# Patient Record
Sex: MALE | Race: WHITE | URBAN - METROPOLITAN AREA
[De-identification: names, ages, dates, MRNs, and addresses within clinical notes are randomized per-mention and may not be internally consistent; named-entity substitution may affect disease eponyms.]

---

## 2023-04-20 ENCOUNTER — OFFICE VISIT (OUTPATIENT)
Dept: CARDIOLOGY CLINIC | Age: 78
End: 2023-04-20

## 2023-04-20 ENCOUNTER — TELEPHONE (OUTPATIENT)
Dept: CARDIOLOGY CLINIC | Age: 78
End: 2023-04-20

## 2023-04-20 VITALS
WEIGHT: 127.2 LBS | HEART RATE: 90 BPM | OXYGEN SATURATION: 92 % | SYSTOLIC BLOOD PRESSURE: 130 MMHG | DIASTOLIC BLOOD PRESSURE: 70 MMHG

## 2023-04-20 DIAGNOSIS — Z76.89 ENCOUNTER TO ESTABLISH CARE: ICD-10-CM

## 2023-04-20 DIAGNOSIS — Z95.828 HISTORY OF ENDOVASCULAR STENT GRAFT FOR ABDOMINAL AORTIC ANEURYSM (AAA): ICD-10-CM

## 2023-04-20 DIAGNOSIS — J44.9 CHRONIC OBSTRUCTIVE PULMONARY DISEASE, UNSPECIFIED COPD TYPE (HCC): ICD-10-CM

## 2023-04-20 DIAGNOSIS — G45.9 TIA (TRANSIENT ISCHEMIC ATTACK): Primary | ICD-10-CM

## 2023-04-20 RX ORDER — CALCIUM CARBONATE 300MG(750)
TABLET,CHEWABLE ORAL
COMMUNITY

## 2023-04-20 RX ORDER — ASPIRIN 81 MG/1
TABLET ORAL DAILY
COMMUNITY

## 2023-04-20 RX ORDER — VITAMIN A 2400 MCG
CAPSULE ORAL
COMMUNITY

## 2023-04-20 RX ORDER — MULTIVITAMIN
1 TABLET ORAL DAILY
COMMUNITY

## 2023-04-20 NOTE — PROGRESS NOTES
Efrain Staples is a 68 y.o. male    Vitals:    04/20/23 1055   BP: 130/70   BP 1 Location: Left upper arm   BP Patient Position: Sitting   BP Cuff Size: Adult   Pulse: 90   Weight: 127 lb 3.2 oz (57.7 kg)   SpO2: 92%       Chief Complaint   Patient presents with    New Patient     Ref Dr. Rula Mistry       Chest pain no  SOB YES  Dizziness YES  Swelling no  Recent hospital visit no  Refills no  COVID VACCINE no  HAD COVID? Yes    Pt takes a cholesterol pill, does not know what it is.      CVA \"MINI STROKE\"

## 2023-04-20 NOTE — TELEPHONE ENCOUNTER
Enrolled with [employer] - Ordered and being shipped to patient's home address on file. ETA within 5-7 business days. 30 day event monitor  Received:  Today  MD Santino Flores  Can you please mail him a 30 day monitor for a TIA     Thanks   SK

## 2023-04-20 NOTE — PROGRESS NOTES
Orders for Check an echo (TIA) and abd aorta doppler (history AAA stenting)     See NP in 8 weeks to review results  per Dr. Vivek Robledo.

## 2023-04-20 NOTE — PROGRESS NOTES
Molly Matthew MD. [facility]          Patient: Nayeli Bautista  : 1945      Today's Date: 2023        HISTORY OF PRESENT ILLNESS:     History of Present Illness:    Referred for TIA's    He says he had one many years ago and then he thinks he had one a few months ago. He says he had dizziness - hard staying on his feet - had tunnel vision -- he did not go to the hospital.  No speech changes or focal weaknesses. He did not go to the hospital.  He did have a head CT scan and other tests with Dr. Francisco Paige. Heart can race sometimes. Has chronic ROSENTHAL - class 3 - chronic. No CP. Still smokes. PAST MEDICAL HISTORY:     Past Medical History:   Diagnosis Date    AAA (abdominal aortic aneurysm) ([facility]     stenting 2019 by Dr. Jazz Oglesby    COPD (chronic obstructive pulmonary disease) ([facility]     History of lung surgery     Lung mass     TIA (transient ischemic attack)            CURRENT MEDICATIONS:    .  Current Outpatient Medications   Medication Sig Dispense Refill    melatonin 10 mg TbDi Take  by mouth. aspirin delayed-release 81 mg tablet Take  by mouth daily. vitamin B complex (B COMPLEX-VITAMIN B12 PO) Take  by mouth.      multivitamin (ONE A DAY) tablet Take 1 Tablet by mouth daily. michelle, Zingiber officinalis, (michelle extract) 250 mg cap Take  by mouth. OTHER PT TAKES A CHOLESTEROL MED, DOES NOT KNOW WHAT THE NAME IS. Allergies   Allergen Reactions    Wool Itching         SOCIAL HISTORY:     Social History     Tobacco Use    Smoking status: Every Day     Types: Cigarettes   Substance Use Topics    Alcohol use: Not Currently         FAMILY HISTORY:     Family History   Problem Relation Age of Onset    Heart Attack Mother     Cancer Father          REVIEW OF SYMPTOMS:     Review of Symptoms:  Constitutional: Negative for fever, chills  HEENT: Negative for nosebleeds  Respiratory: + cough, wheezing  Cardiovascular: Negative for syncope, and PND.    Gastrointestinal: Negative for abdominal pain, diarrhea, melena. Genitourinary: Negative for dysuria  Musculoskeletal: + joint pain. Skin: Negative for rash  Heme: No problems bleeding. Neurological: Negative for speech change and focal weakness. PHYSICAL EXAM:     Physical Exam:  Visit Vitals  /70 (BP 1 Location: Left upper arm, BP Patient Position: Sitting, BP Cuff Size: Adult)   Pulse 90   Wt 127 lb 3.2 oz (57.7 kg)   SpO2 92%       Patient appears generally well, mood and affect are appropriate and pleasant  + Frail   HEENT:  Hearing intact, non-icteric, normocephalic, atraumatic. Neck Exam: Supple, no bruits or JVD  Lung Exam: Clear to auscultation, even breath sounds. Cardiac Exam: Regular rate and rhythm with no murmur or rub  Abdomen: Soft, non-tender  Extremities: Moves all ext well. No lower extremity edema. MSKTL: Overall good ROM ext  Skin: No significant rashes  Psych: Appropriate affect  Neuro - Grossly intact        LABS / OTHER STUDIES:     Will get labs from Dr. Keyla Mcdonough:     Cardiac Evaluation Includes:  I reviewed the test results below. EKG 4/20/23 - NSR, normal       ASSESSMENT AND PLAN:     Assessment and Plan:    TIA   - says he had one years ago and then in 2023 - had dizziness and vision changes with 2023 event --- he did not go to the hospital   - will get records from Dr. Maureen Golden   - check an echo and event monitor (has heart racing at times)   - cont ASA and statin ? (Says he takes a cholesterol med)     Dyslipidemia   - says he takes a cholesterol pill     AAA   - had stenting for AAA in 2019  - says he has not followed up with vascular surgery for years   - check an aorta doppler     COPD     Smoking cessation discussed     See NP in 8 weeks to review results. Wife passed in 2015. 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        Shelli Bui MD, [address] [facility] [address], [address]  [place] [address]  [address]  [place], [address] [place] [address]  Ph: [phone]    [phone]

## 2023-05-31 ENCOUNTER — TELEPHONE (OUTPATIENT)
Age: 78
End: 2023-05-31

## 2023-05-31 NOTE — TELEPHONE ENCOUNTER
Can you please let her know monitor showed normal findings     Thanks! SK     Spoke with patient, name and  verified. Pt verbalized understanding above message. No other question were asked.    Future Appointments   Date Time Provider Adama Yessi   2023 12:30 PM [facility] ECHO 2 [facility] BS AMB   2023  1:30 PM [facility] VASCULAR [facility] BS AMB   2023 11:20 AM [facility] - [facility] CAV[facility] BS AMB

## 2023-07-11 ENCOUNTER — ANCILLARY PROCEDURE (OUTPATIENT)
Age: 78
End: 2023-07-11
Payer: MEDICARE

## 2023-07-11 ENCOUNTER — ANCILLARY PROCEDURE (OUTPATIENT)
Age: 78
End: 2023-07-11

## 2023-07-11 VITALS
SYSTOLIC BLOOD PRESSURE: 146 MMHG | HEART RATE: 65 BPM | BODY MASS INDEX: 19.93 KG/M2 | HEIGHT: 67 IN | DIASTOLIC BLOOD PRESSURE: 62 MMHG | WEIGHT: 127 LBS

## 2023-07-11 DIAGNOSIS — G45.9 TRANSIENT CEREBRAL ISCHEMIC ATTACK, UNSPECIFIED: ICD-10-CM

## 2023-07-11 DIAGNOSIS — Z95.828 PRESENCE OF OTHER VASCULAR IMPLANTS AND GRAFTS: ICD-10-CM

## 2023-07-11 LAB
ECHO AO ASC DIAM: 3.3 CM
ECHO AO ASCENDING AORTA INDEX: 1.98 CM/M2
ECHO AO ROOT DIAM: 3.6 CM
ECHO AO ROOT INDEX: 2.16 CM/M2
ECHO AV AREA PEAK VELOCITY: 3.4 CM2
ECHO AV AREA VTI: 3.5 CM2
ECHO AV AREA/BSA PEAK VELOCITY: 2 CM2/M2
ECHO AV AREA/BSA VTI: 2.1 CM2/M2
ECHO AV MEAN GRADIENT: 3 MMHG
ECHO AV MEAN VELOCITY: 0.8 M/S
ECHO AV PEAK GRADIENT: 5 MMHG
ECHO AV PEAK VELOCITY: 1.2 M/S
ECHO AV VELOCITY RATIO: 0.83
ECHO AV VTI: 22.9 CM
ECHO BSA: 1.65 M2
ECHO BSA: 1.65 M2
ECHO EST RA PRESSURE: 3 MMHG
ECHO LA DIAMETER INDEX: 2.16 CM/M2
ECHO LA DIAMETER: 3.6 CM
ECHO LA TO AORTIC ROOT RATIO: 1
ECHO LA VOL 2C: 53 ML (ref 18–58)
ECHO LA VOL 4C: 51 ML (ref 18–58)
ECHO LA VOLUME AREA LENGTH: 62 ML
ECHO LA VOLUME INDEX A2C: 32 ML/M2 (ref 16–34)
ECHO LA VOLUME INDEX A4C: 31 ML/M2 (ref 16–34)
ECHO LA VOLUME INDEX AREA LENGTH: 37 ML/M2 (ref 16–34)
ECHO LV E' LATERAL VELOCITY: 10 CM/S
ECHO LV E' SEPTAL VELOCITY: 8 CM/S
ECHO LV EDV A2C: 102 ML
ECHO LV EDV A4C: 104 ML
ECHO LV EDV BP: 105 ML (ref 67–155)
ECHO LV EDV INDEX A4C: 62 ML/M2
ECHO LV EDV INDEX BP: 63 ML/M2
ECHO LV EDV NDEX A2C: 61 ML/M2
ECHO LV EJECTION FRACTION A2C: 60 %
ECHO LV EJECTION FRACTION A4C: 63 %
ECHO LV EJECTION FRACTION BIPLANE: 61 % (ref 55–100)
ECHO LV ESV A2C: 41 ML
ECHO LV ESV A4C: 38 ML
ECHO LV ESV BP: 41 ML (ref 22–58)
ECHO LV ESV INDEX A2C: 25 ML/M2
ECHO LV ESV INDEX A4C: 23 ML/M2
ECHO LV ESV INDEX BP: 25 ML/M2
ECHO LV FRACTIONAL SHORTENING: 28 % (ref 28–44)
ECHO LV INTERNAL DIMENSION DIASTOLE INDEX: 2.57 CM/M2
ECHO LV INTERNAL DIMENSION DIASTOLIC: 4.3 CM (ref 4.2–5.9)
ECHO LV INTERNAL DIMENSION SYSTOLIC INDEX: 1.86 CM/M2
ECHO LV INTERNAL DIMENSION SYSTOLIC: 3.1 CM
ECHO LV IVSD: 0.9 CM (ref 0.6–1)
ECHO LV MASS 2D: 114.2 G (ref 88–224)
ECHO LV MASS INDEX 2D: 68.4 G/M2 (ref 49–115)
ECHO LV POSTERIOR WALL DIASTOLIC: 0.8 CM (ref 0.6–1)
ECHO LV RELATIVE WALL THICKNESS RATIO: 0.37
ECHO LVOT AREA: 3.8 CM2
ECHO LVOT AV VTI INDEX: 0.92
ECHO LVOT DIAM: 2.2 CM
ECHO LVOT MEAN GRADIENT: 2 MMHG
ECHO LVOT PEAK GRADIENT: 4 MMHG
ECHO LVOT PEAK VELOCITY: 1 M/S
ECHO LVOT STROKE VOLUME INDEX: 47.8 ML/M2
ECHO LVOT SV: 79.8 ML
ECHO LVOT VTI: 21 CM
ECHO MV A VELOCITY: 0.7 M/S
ECHO MV AREA PHT: 3.7 CM2
ECHO MV AREA VTI: 4 CM2
ECHO MV E DECELERATION TIME (DT): 207.1 MS
ECHO MV E VELOCITY: 0.53 M/S
ECHO MV E/A RATIO: 0.76
ECHO MV E/E' LATERAL: 5.3
ECHO MV E/E' RATIO (AVERAGED): 5.96
ECHO MV E/E' SEPTAL: 6.63
ECHO MV LVOT VTI INDEX: 0.96
ECHO MV MAX VELOCITY: 0.9 M/S
ECHO MV MEAN GRADIENT: 1 MMHG
ECHO MV MEAN VELOCITY: 0.5 M/S
ECHO MV PEAK GRADIENT: 3 MMHG
ECHO MV PRESSURE HALF TIME (PHT): 60.1 MS
ECHO MV VTI: 20.1 CM
ECHO RA AREA 4C: 16 CM2
ECHO RA END SYSTOLIC VOLUME APICAL 4 CHAMBER INDEX BSA: 27 ML/M2
ECHO RA VOLUME: 45 ML
ECHO RIGHT VENTRICULAR SYSTOLIC PRESSURE (RVSP): 64 MMHG
ECHO RV INTERNAL DIMENSION: 4.6 CM
ECHO RV TAPSE: 2.1 CM (ref 1.7–?)
ECHO TV REGURGITANT MAX VELOCITY: 3.89 M/S
ECHO TV REGURGITANT PEAK GRADIENT: 61 MMHG
VAS AORTA MID AP: 2.04 CM
VAS AORTA MID TRANS: 2.15 CM
VAS AORTA PROX AP: 1.72 CM
VAS AORTA PROX TR: 1.72 CM
VAS EVAR LEFT LIMB PROX PSV: 63 CM/S
VAS EVAR RIGHT LIMB PROX PSV: 60 CM/S

## 2023-07-11 PROCEDURE — 93306 TTE W/DOPPLER COMPLETE: CPT

## 2023-07-11 PROCEDURE — 93978 VASCULAR STUDY: CPT | Performed by: SPECIALIST

## 2023-07-11 PROCEDURE — 93978 VASCULAR STUDY: CPT

## 2023-07-17 ENCOUNTER — OFFICE VISIT (OUTPATIENT)
Age: 78
End: 2023-07-17
Payer: MEDICARE

## 2023-07-17 VITALS
HEART RATE: 73 BPM | OXYGEN SATURATION: 96 % | BODY MASS INDEX: 19.24 KG/M2 | HEIGHT: 67 IN | WEIGHT: 122.6 LBS | DIASTOLIC BLOOD PRESSURE: 70 MMHG | SYSTOLIC BLOOD PRESSURE: 140 MMHG

## 2023-07-17 DIAGNOSIS — Z86.73 HISTORY OF TIA (TRANSIENT ISCHEMIC ATTACK): ICD-10-CM

## 2023-07-17 DIAGNOSIS — I10 HYPERTENSION, UNSPECIFIED TYPE: Primary | ICD-10-CM

## 2023-07-17 DIAGNOSIS — J44.9 CHRONIC OBSTRUCTIVE PULMONARY DISEASE, UNSPECIFIED COPD TYPE (HCC): ICD-10-CM

## 2023-07-17 DIAGNOSIS — Z95.828 PRESENCE OF OTHER VASCULAR IMPLANTS AND GRAFTS: ICD-10-CM

## 2023-07-17 PROCEDURE — 99214 OFFICE O/P EST MOD 30 MIN: CPT

## 2023-07-17 PROCEDURE — 3078F DIAST BP <80 MM HG: CPT

## 2023-07-17 PROCEDURE — G8427 DOCREV CUR MEDS BY ELIG CLIN: HCPCS

## 2023-07-17 PROCEDURE — 93010 ELECTROCARDIOGRAM REPORT: CPT

## 2023-07-17 PROCEDURE — G8420 CALC BMI NORM PARAMETERS: HCPCS

## 2023-07-17 PROCEDURE — 1123F ACP DISCUSS/DSCN MKR DOCD: CPT

## 2023-07-17 PROCEDURE — 3023F SPIROM DOC REV: CPT

## 2023-07-17 PROCEDURE — 93005 ELECTROCARDIOGRAM TRACING: CPT

## 2023-07-17 PROCEDURE — 4004F PT TOBACCO SCREEN RCVD TLK: CPT

## 2023-07-17 PROCEDURE — 3077F SYST BP >= 140 MM HG: CPT

## 2023-07-17 RX ORDER — PRAVASTATIN SODIUM 40 MG
40 TABLET ORAL NIGHTLY
COMMUNITY

## 2023-07-17 RX ORDER — ACETAMINOPHEN 325 MG/1
325 TABLET ORAL EVERY 4 HOURS PRN
COMMUNITY

## 2023-07-17 RX ORDER — QUETIAPINE FUMARATE 50 MG/1
TABLET, FILM COATED ORAL
COMMUNITY
Start: 2023-07-06

## 2023-07-17 RX ORDER — OXYCODONE HYDROCHLORIDE 5 MG/1
5 TABLET ORAL EVERY 4 HOURS PRN
COMMUNITY
Start: 2012-01-21

## 2023-07-17 RX ORDER — OMEPRAZOLE 20 MG/1
20 CAPSULE, DELAYED RELEASE ORAL
COMMUNITY
Start: 2015-07-08

## 2023-07-17 RX ORDER — CALCIUM CARBONATE 300MG(750)
TABLET,CHEWABLE ORAL
COMMUNITY

## 2023-07-17 RX ORDER — ROSUVASTATIN CALCIUM 20 MG/1
TABLET, COATED ORAL
COMMUNITY
Start: 2023-06-30

## 2023-07-17 NOTE — PROGRESS NOTES
Manuel Hart is a 68 y.o. male    had concerns including Other (TIA ). Vitals:    07/17/23 1113 07/17/23 1128   BP: (!) 150/76 (!) 140/70   Site: Left Upper Arm Right Upper Arm   Position: Sitting Sitting   Pulse: 73    SpO2: 96%    Weight: 122 lb 9.6 oz (55.6 kg)    Height: 5' 7\" (1.702 m)         Chest pain NO    SOB yes due to COPD    Dizziness NO    Swelling NO    Palpitations NO    Refills NO    Covid Vaccinated yes      1. Have you been to the ER, urgent care clinic since your last visit? Hospitalized since your last visit? NO    2. Have you seen or consulted any other health care providers outside of the [address] [place] [address] since your last visit? Include any pap smears or colon screening.  NO

## 2024-11-20 ENCOUNTER — HOSPITAL ENCOUNTER (INPATIENT)
Facility: HOSPITAL | Age: 79
LOS: 13 days | Discharge: HOSPICE | DRG: 180 | End: 2024-12-03
Attending: EMERGENCY MEDICINE | Admitting: HOSPITALIST
Payer: MEDICARE

## 2024-11-20 ENCOUNTER — APPOINTMENT (OUTPATIENT)
Facility: HOSPITAL | Age: 79
DRG: 180 | End: 2024-11-20
Payer: MEDICARE

## 2024-11-20 DIAGNOSIS — I50.9 ACUTE ON CHRONIC CONGESTIVE HEART FAILURE, UNSPECIFIED HEART FAILURE TYPE (HCC): ICD-10-CM

## 2024-11-20 DIAGNOSIS — J90 PLEURAL EFFUSION: ICD-10-CM

## 2024-11-20 DIAGNOSIS — R79.89 ELEVATED TROPONIN: ICD-10-CM

## 2024-11-20 DIAGNOSIS — J96.21 ACUTE ON CHRONIC RESPIRATORY FAILURE WITH HYPOXIA: Primary | ICD-10-CM

## 2024-11-20 PROBLEM — J96.01 ACUTE HYPOXEMIC RESPIRATORY FAILURE: Status: ACTIVE | Noted: 2024-11-20

## 2024-11-20 LAB
ALBUMIN SERPL-MCNC: 2.8 G/DL (ref 3.5–5)
ALBUMIN/GLOB SERPL: 0.7 (ref 1.1–2.2)
ALP SERPL-CCNC: 110 U/L (ref 45–117)
ALT SERPL-CCNC: 12 U/L (ref 12–78)
ANION GAP SERPL CALC-SCNC: 7 MMOL/L (ref 2–12)
ARTERIAL PATENCY WRIST A: YES
AST SERPL-CCNC: 21 U/L (ref 15–37)
BASE EXCESS BLDA CALC-SCNC: 0 MMOL/L
BASOPHILS # BLD: 0 K/UL (ref 0–0.1)
BASOPHILS NFR BLD: 0 % (ref 0–1)
BDY SITE: ABNORMAL
BILIRUB SERPL-MCNC: 0.6 MG/DL (ref 0.2–1)
BUN SERPL-MCNC: 15 MG/DL (ref 6–20)
BUN/CREAT SERPL: 17 (ref 12–20)
CALCIUM SERPL-MCNC: 9.9 MG/DL (ref 8.5–10.1)
CHLORIDE SERPL-SCNC: 103 MMOL/L (ref 97–108)
CO2 SERPL-SCNC: 25 MMOL/L (ref 21–32)
CREAT SERPL-MCNC: 0.89 MG/DL (ref 0.7–1.3)
DIFFERENTIAL METHOD BLD: ABNORMAL
EOSINOPHIL # BLD: 0.1 K/UL (ref 0–0.4)
EOSINOPHIL NFR BLD: 1 % (ref 0–7)
ERYTHROCYTE [DISTWIDTH] IN BLOOD BY AUTOMATED COUNT: 13.3 % (ref 11.5–14.5)
FLUAV RNA SPEC QL NAA+PROBE: NOT DETECTED
FLUBV RNA SPEC QL NAA+PROBE: NOT DETECTED
GAS FLOW.O2 O2 DELIVERY SYS: 5 L/MIN
GLOBULIN SER CALC-MCNC: 4.3 G/DL (ref 2–4)
GLUCOSE SERPL-MCNC: 125 MG/DL (ref 65–100)
HCO3 BLDA-SCNC: 26 MMOL/L (ref 22–26)
HCT VFR BLD AUTO: 44.4 % (ref 36.6–50.3)
HGB BLD-MCNC: 14.4 G/DL (ref 12.1–17)
IMM GRANULOCYTES # BLD AUTO: 0 K/UL (ref 0–0.04)
IMM GRANULOCYTES NFR BLD AUTO: 0 % (ref 0–0.5)
LYMPHOCYTES # BLD: 1 K/UL (ref 0.8–3.5)
LYMPHOCYTES NFR BLD: 11 % (ref 12–49)
MCH RBC QN AUTO: 31.5 PG (ref 26–34)
MCHC RBC AUTO-ENTMCNC: 32.4 G/DL (ref 30–36.5)
MCV RBC AUTO: 97.2 FL (ref 80–99)
MONOCYTES # BLD: 0.5 K/UL (ref 0–1)
MONOCYTES NFR BLD: 5 % (ref 5–13)
NEUTS SEG # BLD: 8.2 K/UL (ref 1.8–8)
NEUTS SEG NFR BLD: 83 % (ref 32–75)
NRBC # BLD: 0 K/UL (ref 0–0.01)
NRBC BLD-RTO: 0 PER 100 WBC
NT PRO BNP: 7914 PG/ML
PCO2 BLDA: 44 MMHG (ref 35–45)
PH BLDA: 7.38 (ref 7.35–7.45)
PLATELET # BLD AUTO: 187 K/UL (ref 150–400)
PMV BLD AUTO: 9.4 FL (ref 8.9–12.9)
PO2 BLDA: 63 MMHG (ref 80–100)
POTASSIUM SERPL-SCNC: 4.5 MMOL/L (ref 3.5–5.1)
PROT SERPL-MCNC: 7.1 G/DL (ref 6.4–8.2)
RBC # BLD AUTO: 4.57 M/UL (ref 4.1–5.7)
SAO2 % BLD: 91 % (ref 92–97)
SAO2% DEVICE SAO2% SENSOR NAME: ABNORMAL
SARS-COV-2 RNA RESP QL NAA+PROBE: NOT DETECTED
SODIUM SERPL-SCNC: 135 MMOL/L (ref 136–145)
SOURCE: NORMAL
SPECIMEN SITE: ABNORMAL
TROPONIN I SERPL HS-MCNC: 130 NG/L (ref 0–76)
TROPONIN I SERPL HS-MCNC: 137 NG/L (ref 0–76)
WBC # BLD AUTO: 9.8 K/UL (ref 4.1–11.1)

## 2024-11-20 PROCEDURE — 36600 WITHDRAWAL OF ARTERIAL BLOOD: CPT

## 2024-11-20 PROCEDURE — 6370000000 HC RX 637 (ALT 250 FOR IP): Performed by: EMERGENCY MEDICINE

## 2024-11-20 PROCEDURE — 83880 ASSAY OF NATRIURETIC PEPTIDE: CPT

## 2024-11-20 PROCEDURE — 71045 X-RAY EXAM CHEST 1 VIEW: CPT

## 2024-11-20 PROCEDURE — 6370000000 HC RX 637 (ALT 250 FOR IP): Performed by: HOSPITALIST

## 2024-11-20 PROCEDURE — 85025 COMPLETE CBC W/AUTO DIFF WBC: CPT

## 2024-11-20 PROCEDURE — 87636 SARSCOV2 & INF A&B AMP PRB: CPT

## 2024-11-20 PROCEDURE — 71250 CT THORAX DX C-: CPT

## 2024-11-20 PROCEDURE — 6360000002 HC RX W HCPCS: Performed by: EMERGENCY MEDICINE

## 2024-11-20 PROCEDURE — 2060000000 HC ICU INTERMEDIATE R&B

## 2024-11-20 PROCEDURE — 84484 ASSAY OF TROPONIN QUANT: CPT

## 2024-11-20 PROCEDURE — 82803 BLOOD GASES ANY COMBINATION: CPT

## 2024-11-20 PROCEDURE — 96374 THER/PROPH/DIAG INJ IV PUSH: CPT

## 2024-11-20 PROCEDURE — 36415 COLL VENOUS BLD VENIPUNCTURE: CPT

## 2024-11-20 PROCEDURE — 2580000003 HC RX 258: Performed by: HOSPITALIST

## 2024-11-20 PROCEDURE — 80053 COMPREHEN METABOLIC PANEL: CPT

## 2024-11-20 PROCEDURE — 6370000000 HC RX 637 (ALT 250 FOR IP)

## 2024-11-20 PROCEDURE — 99285 EMERGENCY DEPT VISIT HI MDM: CPT

## 2024-11-20 PROCEDURE — 6360000002 HC RX W HCPCS: Performed by: HOSPITALIST

## 2024-11-20 PROCEDURE — 94640 AIRWAY INHALATION TREATMENT: CPT

## 2024-11-20 RX ORDER — SODIUM CHLORIDE 9 MG/ML
INJECTION, SOLUTION INTRAVENOUS PRN
Status: DISCONTINUED | OUTPATIENT
Start: 2024-11-20 | End: 2024-12-03 | Stop reason: HOSPADM

## 2024-11-20 RX ORDER — SODIUM CHLORIDE 0.9 % (FLUSH) 0.9 %
5-40 SYRINGE (ML) INJECTION EVERY 12 HOURS SCHEDULED
Status: DISCONTINUED | OUTPATIENT
Start: 2024-11-20 | End: 2024-12-03 | Stop reason: HOSPADM

## 2024-11-20 RX ORDER — OXYCODONE HYDROCHLORIDE 5 MG/1
5 TABLET ORAL EVERY 4 HOURS PRN
Status: DISCONTINUED | OUTPATIENT
Start: 2024-11-20 | End: 2024-12-03 | Stop reason: HOSPADM

## 2024-11-20 RX ORDER — ONDANSETRON 4 MG/1
4 TABLET, ORALLY DISINTEGRATING ORAL EVERY 8 HOURS PRN
Status: DISCONTINUED | OUTPATIENT
Start: 2024-11-20 | End: 2024-12-03 | Stop reason: HOSPADM

## 2024-11-20 RX ORDER — FUROSEMIDE 10 MG/ML
20 INJECTION INTRAMUSCULAR; INTRAVENOUS 2 TIMES DAILY
Status: DISCONTINUED | OUTPATIENT
Start: 2024-11-21 | End: 2024-11-22

## 2024-11-20 RX ORDER — MAGNESIUM SULFATE IN WATER 40 MG/ML
2000 INJECTION, SOLUTION INTRAVENOUS PRN
Status: DISCONTINUED | OUTPATIENT
Start: 2024-11-20 | End: 2024-12-03 | Stop reason: HOSPADM

## 2024-11-20 RX ORDER — ACETAMINOPHEN 650 MG/1
650 SUPPOSITORY RECTAL EVERY 6 HOURS PRN
Status: DISCONTINUED | OUTPATIENT
Start: 2024-11-20 | End: 2024-12-03 | Stop reason: HOSPADM

## 2024-11-20 RX ORDER — PRAVASTATIN SODIUM 20 MG
40 TABLET ORAL NIGHTLY
Status: DISCONTINUED | OUTPATIENT
Start: 2024-11-20 | End: 2024-12-03 | Stop reason: HOSPADM

## 2024-11-20 RX ORDER — ASPIRIN 81 MG/1
81 TABLET, CHEWABLE ORAL DAILY
Status: DISCONTINUED | OUTPATIENT
Start: 2024-11-21 | End: 2024-12-03 | Stop reason: HOSPADM

## 2024-11-20 RX ORDER — ACETAMINOPHEN 325 MG/1
650 TABLET ORAL EVERY 6 HOURS PRN
Status: DISCONTINUED | OUTPATIENT
Start: 2024-11-20 | End: 2024-12-03 | Stop reason: HOSPADM

## 2024-11-20 RX ORDER — ASPIRIN 81 MG/1
324 TABLET, CHEWABLE ORAL ONCE
Status: COMPLETED | OUTPATIENT
Start: 2024-11-20 | End: 2024-11-20

## 2024-11-20 RX ORDER — FUROSEMIDE 10 MG/ML
40 INJECTION INTRAMUSCULAR; INTRAVENOUS ONCE
Status: COMPLETED | OUTPATIENT
Start: 2024-11-20 | End: 2024-11-20

## 2024-11-20 RX ORDER — IPRATROPIUM BROMIDE AND ALBUTEROL SULFATE 2.5; .5 MG/3ML; MG/3ML
1 SOLUTION RESPIRATORY (INHALATION)
Status: DISCONTINUED | OUTPATIENT
Start: 2024-11-20 | End: 2024-11-22

## 2024-11-20 RX ORDER — POLYETHYLENE GLYCOL 3350 17 G/17G
17 POWDER, FOR SOLUTION ORAL DAILY PRN
Status: DISCONTINUED | OUTPATIENT
Start: 2024-11-20 | End: 2024-12-03 | Stop reason: HOSPADM

## 2024-11-20 RX ORDER — POTASSIUM CHLORIDE 7.45 MG/ML
10 INJECTION INTRAVENOUS PRN
Status: DISCONTINUED | OUTPATIENT
Start: 2024-11-20 | End: 2024-12-03 | Stop reason: HOSPADM

## 2024-11-20 RX ORDER — POTASSIUM CHLORIDE 750 MG/1
40 TABLET, EXTENDED RELEASE ORAL PRN
Status: DISCONTINUED | OUTPATIENT
Start: 2024-11-20 | End: 2024-12-03 | Stop reason: HOSPADM

## 2024-11-20 RX ORDER — SODIUM CHLORIDE 0.9 % (FLUSH) 0.9 %
5-40 SYRINGE (ML) INJECTION PRN
Status: DISCONTINUED | OUTPATIENT
Start: 2024-11-20 | End: 2024-12-03 | Stop reason: HOSPADM

## 2024-11-20 RX ORDER — IPRATROPIUM BROMIDE AND ALBUTEROL SULFATE 2.5; .5 MG/3ML; MG/3ML
1 SOLUTION RESPIRATORY (INHALATION)
Status: COMPLETED | OUTPATIENT
Start: 2024-11-20 | End: 2024-11-20

## 2024-11-20 RX ORDER — QUETIAPINE FUMARATE 25 MG/1
50 TABLET, FILM COATED ORAL NIGHTLY
Status: DISCONTINUED | OUTPATIENT
Start: 2024-11-20 | End: 2024-11-29

## 2024-11-20 RX ORDER — PANTOPRAZOLE SODIUM 40 MG/1
40 TABLET, DELAYED RELEASE ORAL
Status: DISCONTINUED | OUTPATIENT
Start: 2024-11-21 | End: 2024-12-03 | Stop reason: HOSPADM

## 2024-11-20 RX ORDER — ALBUTEROL SULFATE 1.25 MG/3ML
1.25 SOLUTION RESPIRATORY (INHALATION) EVERY 6 HOURS PRN
Status: DISCONTINUED | OUTPATIENT
Start: 2024-11-20 | End: 2024-12-03 | Stop reason: HOSPADM

## 2024-11-20 RX ORDER — DOXYCYCLINE HYCLATE 100 MG
100 TABLET ORAL 2 TIMES DAILY
Status: DISCONTINUED | OUTPATIENT
Start: 2024-11-20 | End: 2024-11-22

## 2024-11-20 RX ORDER — ONDANSETRON 2 MG/ML
4 INJECTION INTRAMUSCULAR; INTRAVENOUS EVERY 6 HOURS PRN
Status: DISCONTINUED | OUTPATIENT
Start: 2024-11-20 | End: 2024-12-03 | Stop reason: HOSPADM

## 2024-11-20 RX ADMIN — ASPIRIN 324 MG: 81 TABLET, CHEWABLE ORAL at 11:10

## 2024-11-20 RX ADMIN — PRAVASTATIN SODIUM 40 MG: 20 TABLET ORAL at 20:30

## 2024-11-20 RX ADMIN — Medication 9 MG: at 20:30

## 2024-11-20 RX ADMIN — QUETIAPINE FUMARATE 50 MG: 100 TABLET ORAL at 20:30

## 2024-11-20 RX ADMIN — IPRATROPIUM BROMIDE AND ALBUTEROL SULFATE 1 DOSE: 2.5; .5 SOLUTION RESPIRATORY (INHALATION) at 10:18

## 2024-11-20 RX ADMIN — WATER 1000 MG: 1 INJECTION INTRAMUSCULAR; INTRAVENOUS; SUBCUTANEOUS at 16:09

## 2024-11-20 RX ADMIN — SODIUM CHLORIDE, PRESERVATIVE FREE 10 ML: 5 INJECTION INTRAVENOUS at 20:33

## 2024-11-20 RX ADMIN — FUROSEMIDE 40 MG: 10 INJECTION, SOLUTION INTRAMUSCULAR; INTRAVENOUS at 11:10

## 2024-11-20 RX ADMIN — IPRATROPIUM BROMIDE AND ALBUTEROL SULFATE 1 DOSE: 2.5; .5 SOLUTION RESPIRATORY (INHALATION) at 20:31

## 2024-11-20 RX ADMIN — DOXYCYCLINE HYCLATE 100 MG: 100 TABLET, COATED ORAL at 20:30

## 2024-11-20 ASSESSMENT — PAIN SCALES - GENERAL: PAINLEVEL_OUTOF10: 0

## 2024-11-20 ASSESSMENT — PAIN - FUNCTIONAL ASSESSMENT: PAIN_FUNCTIONAL_ASSESSMENT: 0-10

## 2024-11-20 NOTE — ED NOTES
TRANSFER - OUT REPORT:    Verbal report given to Lina RN on Davis Dixon  being transferred to 3rd floor for routine progression of patient care       Report consisted of patient's Situation, Background, Assessment and   Recommendations(SBAR).     Information from the following report(s) Index, MAR, Recent Results, and Neuro Assessment was reviewed with the receiving nurse.    [place] Fall Assessment:    Presents to emergency department  because of falls (Syncope, seizure, or loss of consciousness): No  Age > 70: Yes  Altered Mental Status, Intoxication with alcohol or substance confusion (Disorientation, impaired judgment, poor safety awaremess, or inability to follow instructions): No  Impaired Mobility: Ambulates or transfers with assistive devices or assistance; Unable to ambulate or transer.: Yes  Nursing Judgement: Yes          Lines:   Peripheral IV 11/20/24 Left;Posterior Forearm (Active)        Opportunity for questions and clarification was provided.      Patient transported with:  O2 @ 5lpm, Monitor

## 2024-11-20 NOTE — ED PROVIDER NOTES
(SEROQUEL) 50 MG TABLET        ROSUVASTATIN (CRESTOR) 20 MG TABLET    TAKE 1 TABLET BY MOUTH AFTER SUPPER FOR CHOLESTEROL       ALLERGIES     Other    FAMILY HISTORY       Family History   Problem Relation Age of Onset    Cancer Father     Heart Attack Mother           SOCIAL HISTORY       Social History     Socioeconomic History    Marital status:    Tobacco Use    Smoking status: Every Day   Substance and Sexual Activity    Alcohol use: Not Currently         PHYSICAL EXAM       ED Triage Vitals   BP Systolic BP Percentile Diastolic BP Percentile Temp Temp src Pulse Respirations SpO2   11/20/24 1012 -- -- 11/20/24 0958 -- 11/20/24 0958 11/20/24 0958 11/20/24 0958   (!) 142/77   98.1 °F (36.7 °C)  (!) 110 28 (!) 68 %      Height Weight - Scale         11/20/24 0958 11/20/24 0958         1.702 m (5' 7\") 54.4 kg (120 lb)             Body mass index is 18.79 kg/m².    Physical Exam  Constitutional:       Comments: Chronically ill-appearing but not distressed   Neck:      Vascular: No JVD.      Comments: Trachea midline  Cardiovascular:      Rate and Rhythm: Normal rate and regular rhythm.      Heart sounds: No murmur heard.  Pulmonary:      Comments: No respiratory distress.  Good air movement on the left.  Decreased breath sounds at the right lung base  Abdominal:      Palpations: Abdomen is soft.      Tenderness: There is no abdominal tenderness.   Musculoskeletal:         General: Normal range of motion.      Cervical back: Normal range of motion.      Right lower leg: No edema.      Left lower leg: No edema.   Skin:     General: Skin is warm and dry.   Neurological:      Comments: Awake and alert.  GCS 15             EMERGENCY DEPARTMENT COURSE and DIFFERENTIAL DIAGNOSIS/MDM:   Vitals:    Vitals:    11/20/24 1012 11/20/24 1015 11/20/24 1030 11/20/24 1045   BP: (!) 142/77 118/69 126/63    Pulse: 88 87 94 82   Resp:  23  19   Temp:       SpO2:  95% 92% 98%   Weight:       Height:             Medical Decision

## 2024-11-20 NOTE — H&P
polydipsia  Neurological: negative for headache, dizziness, confusion, focal weakness, paresthesia, memory loss, gait disturbance  Psychological: negative for anxiety, depression, agitation      Objective:   VITALS:    Vitals:    11/20/24 1430   BP: 125/72   Pulse: 98   Resp: 25   Temp:    SpO2: 95%     PHYSICAL EXAM:    Physical Exam:    Gen: Well-developed, well-nourished, in no acute distress  HEENT:  Pink conjunctivae, PERRL, hearing intact to voice, moist mucous membranes  Neck: Supple, without masses, thyroid non-tender  Resp: No accessory muscle use, clear breath sounds without wheezes rales or rhonchi  Card: No murmurs, normal S1, S2 without thrills, bruits or peripheral edema  Abd:  Soft, non-tender, non-distended, normoactive bowel sounds are present, no palpable organomegaly and no detectable hernias  Lymph:  No cervical or inguinal adenopathy  Musc: No cyanosis or clubbing  Skin: No rashes or ulcers, skin turgor is good  Neuro:  Cranial nerves are grossly intact, no focal motor weakness, follows commands appropriately  Psych:  Good insight, oriented to person, place and time, alert          ________________________________________Data Review: I have reviewed reports and independently interpreted the following  diagnostic tests    Diagnostic testing:    Laboratory data reviewed and independently interpreted:    Recent Labs     11/20/24  1005   WBC 9.8   HGB 14.4   HCT 44.4   RBC 4.57   MCV 97.2   MCH 31.5        No results found for: \"LACTA\"  Recent Labs     11/20/24  1005   *   K 4.5      CO2 25   GLUCOSE 125*   BUN 15   CREATININE 0.89   CALCIUM 9.9   BILITOT 0.6   ALKPHOS 110   AST 21   ALT 12     No components found for: \"GLUCOSEPOC\"  No results found for: \"CHOL\", \"TRIG\", \"HDL\"    Imaging data reviewed:    CT CHEST WO CONTRAST    Result Date: 11/20/2024  1. Moderate right pleural effusion with collapse of the right base. Severe emphysema. Recommend follow-up to exclude underlying

## 2024-11-21 ENCOUNTER — APPOINTMENT (OUTPATIENT)
Facility: HOSPITAL | Age: 79
DRG: 180 | End: 2024-11-21
Attending: HOSPITALIST
Payer: MEDICARE

## 2024-11-21 ENCOUNTER — APPOINTMENT (OUTPATIENT)
Facility: HOSPITAL | Age: 79
DRG: 180 | End: 2024-11-21
Payer: MEDICARE

## 2024-11-21 PROBLEM — E43 SEVERE PROTEIN-CALORIE MALNUTRITION (HCC): Chronic | Status: ACTIVE | Noted: 2024-11-21

## 2024-11-21 LAB
ANION GAP SERPL CALC-SCNC: 5 MMOL/L (ref 2–12)
APPEARANCE FLD: ABNORMAL
BASOPHILS # BLD: 0.1 K/UL (ref 0–0.1)
BASOPHILS NFR BLD: 1 % (ref 0–1)
BODY FLD TYPE: NORMAL
BUN SERPL-MCNC: 10 MG/DL (ref 6–20)
BUN/CREAT SERPL: 13 (ref 12–20)
CALCIUM SERPL-MCNC: 9.8 MG/DL (ref 8.5–10.1)
CHLORIDE SERPL-SCNC: 104 MMOL/L (ref 97–108)
CO2 SERPL-SCNC: 31 MMOL/L (ref 21–32)
COLOR FLD: YELLOW
CREAT SERPL-MCNC: 0.8 MG/DL (ref 0.7–1.3)
DIFFERENTIAL METHOD BLD: ABNORMAL
EOSINOPHIL # BLD: 0.1 K/UL (ref 0–0.4)
EOSINOPHIL NFR BLD: 1 % (ref 0–7)
ERYTHROCYTE [DISTWIDTH] IN BLOOD BY AUTOMATED COUNT: 13.2 % (ref 11.5–14.5)
GLUCOSE FLD-MCNC: 101 MG/DL
GLUCOSE SERPL-MCNC: 124 MG/DL (ref 65–100)
HCT VFR BLD AUTO: 36.4 % (ref 36.6–50.3)
HGB BLD-MCNC: 11.9 G/DL (ref 12.1–17)
IMM GRANULOCYTES # BLD AUTO: 0 K/UL (ref 0–0.04)
IMM GRANULOCYTES NFR BLD AUTO: 0 % (ref 0–0.5)
LDH FLD L TO P-CCNC: 444 U/L
LYMPHOCYTES # BLD: 1.4 K/UL (ref 0.8–3.5)
LYMPHOCYTES NFR BLD: 20 % (ref 12–49)
LYMPHOCYTES NFR FLD: 51 %
MCH RBC QN AUTO: 31.8 PG (ref 26–34)
MCHC RBC AUTO-ENTMCNC: 32.7 G/DL (ref 30–36.5)
MCV RBC AUTO: 97.3 FL (ref 80–99)
MONOCYTES # BLD: 0.6 K/UL (ref 0–1)
MONOCYTES NFR BLD: 8 % (ref 5–13)
MONOS+MACROS NFR FLD: 38 %
NEUTROPHILS NFR FLD: 11 %
NEUTS SEG # BLD: 5 K/UL (ref 1.8–8)
NEUTS SEG NFR BLD: 70 % (ref 32–75)
NRBC # BLD: 0 K/UL (ref 0–0.01)
NRBC BLD-RTO: 0 PER 100 WBC
NUC CELL # FLD: 1031 /CU MM
PH FLD: 6.8
PLATELET # BLD AUTO: 172 K/UL (ref 150–400)
PMV BLD AUTO: 9.4 FL (ref 8.9–12.9)
POTASSIUM SERPL-SCNC: 4 MMOL/L (ref 3.5–5.1)
PROT FLD-MCNC: 3.9 G/DL
RBC # BLD AUTO: 3.74 M/UL (ref 4.1–5.7)
RBC # FLD: >100 /CU MM
SODIUM SERPL-SCNC: 140 MMOL/L (ref 136–145)
SPECIMEN SOURCE FLD: ABNORMAL
SPECIMEN SOURCE FLD: NORMAL
WBC # BLD AUTO: 7.2 K/UL (ref 4.1–11.1)

## 2024-11-21 PROCEDURE — 87102 FUNGUS ISOLATION CULTURE: CPT

## 2024-11-21 PROCEDURE — 80048 BASIC METABOLIC PNL TOTAL CA: CPT

## 2024-11-21 PROCEDURE — 71045 X-RAY EXAM CHEST 1 VIEW: CPT

## 2024-11-21 PROCEDURE — 83615 LACTATE (LD) (LDH) ENZYME: CPT

## 2024-11-21 PROCEDURE — C1729 CATH, DRAINAGE: HCPCS

## 2024-11-21 PROCEDURE — 87206 SMEAR FLUORESCENT/ACID STAI: CPT

## 2024-11-21 PROCEDURE — 6370000000 HC RX 637 (ALT 250 FOR IP): Performed by: HOSPITALIST

## 2024-11-21 PROCEDURE — 6370000000 HC RX 637 (ALT 250 FOR IP)

## 2024-11-21 PROCEDURE — 2580000003 HC RX 258: Performed by: HOSPITALIST

## 2024-11-21 PROCEDURE — 88341 IMHCHEM/IMCYTCHM EA ADD ANTB: CPT

## 2024-11-21 PROCEDURE — 6360000002 HC RX W HCPCS: Performed by: INTERNAL MEDICINE

## 2024-11-21 PROCEDURE — 88305 TISSUE EXAM BY PATHOLOGIST: CPT

## 2024-11-21 PROCEDURE — 2700000000 HC OXYGEN THERAPY PER DAY

## 2024-11-21 PROCEDURE — 82945 GLUCOSE OTHER FLUID: CPT

## 2024-11-21 PROCEDURE — 94761 N-INVAS EAR/PLS OXIMETRY MLT: CPT

## 2024-11-21 PROCEDURE — 85025 COMPLETE CBC W/AUTO DIFF WBC: CPT

## 2024-11-21 PROCEDURE — 84157 ASSAY OF PROTEIN OTHER: CPT

## 2024-11-21 PROCEDURE — 87070 CULTURE OTHR SPECIMN AEROBIC: CPT

## 2024-11-21 PROCEDURE — 87205 SMEAR GRAM STAIN: CPT

## 2024-11-21 PROCEDURE — 89050 BODY FLUID CELL COUNT: CPT

## 2024-11-21 PROCEDURE — 6360000002 HC RX W HCPCS: Performed by: HOSPITALIST

## 2024-11-21 PROCEDURE — 88342 IMHCHEM/IMCYTCHM 1ST ANTB: CPT

## 2024-11-21 PROCEDURE — 97165 OT EVAL LOW COMPLEX 30 MIN: CPT

## 2024-11-21 PROCEDURE — 93306 TTE W/DOPPLER COMPLETE: CPT | Performed by: INTERNAL MEDICINE

## 2024-11-21 PROCEDURE — 94640 AIRWAY INHALATION TREATMENT: CPT

## 2024-11-21 PROCEDURE — 83986 ASSAY PH BODY FLUID NOS: CPT

## 2024-11-21 PROCEDURE — 2500000003 HC RX 250 WO HCPCS: Performed by: PHYSICIAN ASSISTANT

## 2024-11-21 PROCEDURE — 94010 BREATHING CAPACITY TEST: CPT

## 2024-11-21 PROCEDURE — 97530 THERAPEUTIC ACTIVITIES: CPT

## 2024-11-21 PROCEDURE — 36415 COLL VENOUS BLD VENIPUNCTURE: CPT

## 2024-11-21 PROCEDURE — 2060000000 HC ICU INTERMEDIATE R&B

## 2024-11-21 PROCEDURE — 87116 MYCOBACTERIA CULTURE: CPT

## 2024-11-21 PROCEDURE — 93306 TTE W/DOPPLER COMPLETE: CPT

## 2024-11-21 PROCEDURE — 88112 CYTOPATH CELL ENHANCE TECH: CPT

## 2024-11-21 RX ORDER — BUDESONIDE 0.5 MG/2ML
0.5 INHALANT ORAL
Status: DISCONTINUED | OUTPATIENT
Start: 2024-11-21 | End: 2024-12-03 | Stop reason: HOSPADM

## 2024-11-21 RX ORDER — LORAZEPAM 0.5 MG/1
0.5 TABLET ORAL 3 TIMES DAILY PRN
Status: DISCONTINUED | OUTPATIENT
Start: 2024-11-21 | End: 2024-12-03 | Stop reason: HOSPADM

## 2024-11-21 RX ORDER — ARFORMOTEROL TARTRATE 15 UG/2ML
15 SOLUTION RESPIRATORY (INHALATION)
Status: DISCONTINUED | OUTPATIENT
Start: 2024-11-21 | End: 2024-12-03 | Stop reason: HOSPADM

## 2024-11-21 RX ORDER — ENOXAPARIN SODIUM 100 MG/ML
40 INJECTION SUBCUTANEOUS DAILY
Status: DISCONTINUED | OUTPATIENT
Start: 2024-11-22 | End: 2024-11-22

## 2024-11-21 RX ORDER — LIDOCAINE HYDROCHLORIDE 10 MG/ML
10 INJECTION, SOLUTION EPIDURAL; INFILTRATION; INTRACAUDAL; PERINEURAL ONCE
Status: COMPLETED | OUTPATIENT
Start: 2024-11-21 | End: 2024-11-21

## 2024-11-21 RX ADMIN — Medication 9 MG: at 20:01

## 2024-11-21 RX ADMIN — IPRATROPIUM BROMIDE AND ALBUTEROL SULFATE 1 DOSE: 2.5; .5 SOLUTION RESPIRATORY (INHALATION) at 07:20

## 2024-11-21 RX ADMIN — IPRATROPIUM BROMIDE AND ALBUTEROL SULFATE 1 DOSE: 2.5; .5 SOLUTION RESPIRATORY (INHALATION) at 12:12

## 2024-11-21 RX ADMIN — BUDESONIDE INHALATION 500 MCG: 0.5 SUSPENSION RESPIRATORY (INHALATION) at 19:26

## 2024-11-21 RX ADMIN — QUETIAPINE FUMARATE 50 MG: 100 TABLET ORAL at 22:10

## 2024-11-21 RX ADMIN — DOXYCYCLINE HYCLATE 100 MG: 100 TABLET, COATED ORAL at 09:04

## 2024-11-21 RX ADMIN — DOXYCYCLINE HYCLATE 100 MG: 100 TABLET, COATED ORAL at 20:01

## 2024-11-21 RX ADMIN — ARFORMOTEROL TARTRATE 15 MCG: 15 SOLUTION RESPIRATORY (INHALATION) at 09:43

## 2024-11-21 RX ADMIN — PRAVASTATIN SODIUM 40 MG: 20 TABLET ORAL at 22:10

## 2024-11-21 RX ADMIN — WATER 1000 MG: 1 INJECTION INTRAMUSCULAR; INTRAVENOUS; SUBCUTANEOUS at 15:17

## 2024-11-21 RX ADMIN — LIDOCAINE HYDROCHLORIDE 10 ML: 10 INJECTION, SOLUTION EPIDURAL; INFILTRATION; INTRACAUDAL; PERINEURAL at 08:30

## 2024-11-21 RX ADMIN — OXYCODONE 5 MG: 5 TABLET ORAL at 09:04

## 2024-11-21 RX ADMIN — ALBUTEROL SULFATE 1.25 MG: 1.25 SOLUTION RESPIRATORY (INHALATION) at 03:33

## 2024-11-21 RX ADMIN — ACETAMINOPHEN 650 MG: 325 TABLET ORAL at 11:19

## 2024-11-21 RX ADMIN — ARFORMOTEROL TARTRATE 15 MCG: 15 SOLUTION RESPIRATORY (INHALATION) at 19:26

## 2024-11-21 RX ADMIN — OXYCODONE 5 MG: 5 TABLET ORAL at 15:17

## 2024-11-21 RX ADMIN — SODIUM CHLORIDE, PRESERVATIVE FREE 10 ML: 5 INJECTION INTRAVENOUS at 20:03

## 2024-11-21 RX ADMIN — FUROSEMIDE 20 MG: 10 INJECTION, SOLUTION INTRAMUSCULAR; INTRAVENOUS at 09:04

## 2024-11-21 RX ADMIN — BUDESONIDE INHALATION 500 MCG: 0.5 SUSPENSION RESPIRATORY (INHALATION) at 09:43

## 2024-11-21 RX ADMIN — ACETAMINOPHEN 650 MG: 325 TABLET ORAL at 22:14

## 2024-11-21 RX ADMIN — OXYCODONE 5 MG: 5 TABLET ORAL at 20:02

## 2024-11-21 RX ADMIN — IPRATROPIUM BROMIDE AND ALBUTEROL SULFATE 1 DOSE: 2.5; .5 SOLUTION RESPIRATORY (INHALATION) at 19:20

## 2024-11-21 RX ADMIN — IPRATROPIUM BROMIDE AND ALBUTEROL SULFATE 1 DOSE: 2.5; .5 SOLUTION RESPIRATORY (INHALATION) at 16:07

## 2024-11-21 ASSESSMENT — PAIN - FUNCTIONAL ASSESSMENT
PAIN_FUNCTIONAL_ASSESSMENT: ACTIVITIES ARE NOT PREVENTED
PAIN_FUNCTIONAL_ASSESSMENT: ACTIVITIES ARE NOT PREVENTED

## 2024-11-21 ASSESSMENT — PAIN DESCRIPTION - DESCRIPTORS
DESCRIPTORS: SHARP
DESCRIPTORS: SHARP

## 2024-11-21 ASSESSMENT — PAIN SCALES - GENERAL
PAINLEVEL_OUTOF10: 4
PAINLEVEL_OUTOF10: 6
PAINLEVEL_OUTOF10: 5
PAINLEVEL_OUTOF10: 8
PAINLEVEL_OUTOF10: 7
PAINLEVEL_OUTOF10: 4

## 2024-11-21 ASSESSMENT — PAIN DESCRIPTION - LOCATION
LOCATION: ABDOMEN
LOCATION: RIB CAGE
LOCATION: ABDOMEN
LOCATION: CHEST;RIB CAGE

## 2024-11-21 ASSESSMENT — COPD QUESTIONNAIRES
GOLD_GROUP: GROUP B
TOTAL_EXACERBATIONS_PASTYEAR: 0

## 2024-11-21 ASSESSMENT — PAIN DESCRIPTION - ORIENTATION
ORIENTATION: RIGHT

## 2024-11-21 NOTE — CARE COORDINATION
Care Management Initial Assessment  11/21/2024 12:21 PM  If patient is discharged prior to next notation, then this note serves as note for discharge by case management.    Reason for Admission:   Pleural effusion [J90]  Elevated troponin [R79.89]  Acute on chronic respiratory failure with hypoxia [J96.21]  Acute hypoxemic respiratory failure [J96.01]  Acute on chronic congestive heart failure, unspecified heart failure type (HCC) [I50.9]         Patient Admission Status: Inpatient  Date Admitted to INP: 11/20/24  RUR: Readmission Risk Score: 10.3    Hospitalization in the last 30 days (Readmission):  No        Advance Care Planning:  Code Status: Full Code  Primary Healthcare Decision Maker:     Advance Directive: has NO advanced directive - not interested in additional information     __________________________________________________________________________  Assessment:      11/21/24 1220   Service Assessment   Patient Orientation Alert and Oriented   History Provided By Patient   Primary Caregiver Self   Support Systems Children   Prior Functional Level Independent in ADLs/IADLs   Social/Functional History   Lives With Daughter   Discharge Planning   Current Services Prior To Admission Oxygen Therapy  ([facility], 2L @ baseline)   Patient expects to be discharged to: House   Services At/After Discharge   Mode of Transport at Discharge Other (see comment)  (daughter)   Confirm Follow Up Transport Self     Comments: Patient with low readmission risk score of 10%.  Consult for Ansible placed. CM sent referral. Patient has home O2 and home neb in working order.  Family to transport at time of dc.      Discharge Concerns: []Yes [x]No []Unknown   Describe:    Financial concerns/barriers: []Yes, explain: []No [x]Unknown/Not discussed  __________________________________________________________________________    Insurer:   Active Insurance as of 11/20/2024       Primary Coverage       Payor Plan Insurance Group

## 2024-11-22 ENCOUNTER — APPOINTMENT (OUTPATIENT)
Dept: VASCULAR SURGERY | Facility: HOSPITAL | Age: 79
DRG: 180 | End: 2024-11-22
Attending: INTERNAL MEDICINE
Payer: MEDICARE

## 2024-11-22 ENCOUNTER — APPOINTMENT (OUTPATIENT)
Facility: HOSPITAL | Age: 79
DRG: 180 | End: 2024-11-22
Payer: MEDICARE

## 2024-11-22 LAB
ANION GAP SERPL CALC-SCNC: 5 MMOL/L (ref 2–12)
APTT PPP: 34.8 SEC (ref 22.1–31)
ARTERIAL PATENCY WRIST A: POSITIVE
BASE EXCESS BLD CALC-SCNC: 2.3 MMOL/L
BASOPHILS # BLD: 0.1 K/UL (ref 0–0.1)
BASOPHILS NFR BLD: 0 % (ref 0–1)
BDY SITE: ABNORMAL
BUN SERPL-MCNC: 15 MG/DL (ref 6–20)
BUN/CREAT SERPL: 20 (ref 12–20)
CALCIUM SERPL-MCNC: 9.4 MG/DL (ref 8.5–10.1)
CHLORIDE SERPL-SCNC: 102 MMOL/L (ref 97–108)
CO2 SERPL-SCNC: 31 MMOL/L (ref 21–32)
CREAT SERPL-MCNC: 0.75 MG/DL (ref 0.7–1.3)
CYTOLOGY-NON GYN: NORMAL
DIFFERENTIAL METHOD BLD: ABNORMAL
ECHO AO ARCH DIAM: 2.1 CM
ECHO AO ROOT DIAM: 2.9 CM
ECHO AO ROOT INDEX: 1.74 CM/M2
ECHO AV AREA PEAK VELOCITY: 2 CM2
ECHO AV AREA VTI: 2.9 CM2
ECHO AV AREA/BSA PEAK VELOCITY: 1.2 CM2/M2
ECHO AV AREA/BSA VTI: 1.7 CM2/M2
ECHO AV MEAN GRADIENT: 3 MMHG
ECHO AV MEAN VELOCITY: 0.8 M/S
ECHO AV PEAK GRADIENT: 6 MMHG
ECHO AV PEAK VELOCITY: 1.2 M/S
ECHO AV VELOCITY RATIO: 0.58
ECHO AV VTI: 15.9 CM
ECHO BSA: 1.65 M2
ECHO LV E' LATERAL VELOCITY: 7.93 CM/S
ECHO LV E' SEPTAL VELOCITY: 4.23 CM/S
ECHO LV EDV A2C: 40 ML
ECHO LV EDV A4C: 53 ML
ECHO LV EDV BP: 47 ML (ref 67–155)
ECHO LV EDV INDEX A4C: 32 ML/M2
ECHO LV EDV INDEX BP: 28 ML/M2
ECHO LV EDV NDEX A2C: 24 ML/M2
ECHO LV EJECTION FRACTION A2C: 82 %
ECHO LV EJECTION FRACTION A4C: 59 %
ECHO LV EJECTION FRACTION BIPLANE: 67 % (ref 55–100)
ECHO LV ESV A2C: 7 ML
ECHO LV ESV A4C: 22 ML
ECHO LV ESV BP: 16 ML (ref 22–58)
ECHO LV ESV INDEX A2C: 4 ML/M2
ECHO LV ESV INDEX A4C: 13 ML/M2
ECHO LV ESV INDEX BP: 10 ML/M2
ECHO LV FRACTIONAL SHORTENING: 32 % (ref 28–44)
ECHO LV INTERNAL DIMENSION DIASTOLE INDEX: 2.28 CM/M2
ECHO LV INTERNAL DIMENSION DIASTOLIC: 3.8 CM (ref 4.2–5.9)
ECHO LV INTERNAL DIMENSION SYSTOLIC INDEX: 1.56 CM/M2
ECHO LV INTERNAL DIMENSION SYSTOLIC: 2.6 CM
ECHO LV IVSD: 0.8 CM (ref 0.6–1)
ECHO LV MASS 2D: 86 G (ref 88–224)
ECHO LV MASS INDEX 2D: 51.5 G/M2 (ref 49–115)
ECHO LV POSTERIOR WALL DIASTOLIC: 0.8 CM (ref 0.6–1)
ECHO LV RELATIVE WALL THICKNESS RATIO: 0.42
ECHO LVOT AREA: 3.5 CM2
ECHO LVOT AV VTI INDEX: 0.86
ECHO LVOT DIAM: 2.1 CM
ECHO LVOT MEAN GRADIENT: 1 MMHG
ECHO LVOT PEAK GRADIENT: 2 MMHG
ECHO LVOT PEAK VELOCITY: 0.7 M/S
ECHO LVOT STROKE VOLUME INDEX: 28.4 ML/M2
ECHO LVOT SV: 47.4 ML
ECHO LVOT VTI: 13.7 CM
ECHO MV A VELOCITY: 0.63 M/S
ECHO MV AREA VTI: 4.3 CM2
ECHO MV E DECELERATION TIME (DT): 153.6 MS
ECHO MV E VELOCITY: 0.36 M/S
ECHO MV E/A RATIO: 0.57
ECHO MV E/E' LATERAL: 4.54
ECHO MV E/E' RATIO (AVERAGED): 6.53
ECHO MV E/E' SEPTAL: 8.51
ECHO MV LVOT VTI INDEX: 0.8
ECHO MV MAX VELOCITY: 0.7 M/S
ECHO MV MEAN GRADIENT: 1 MMHG
ECHO MV MEAN VELOCITY: 0.4 M/S
ECHO MV PEAK GRADIENT: 2 MMHG
ECHO MV VTI: 11 CM
ECHO PV MAX VELOCITY: 0.7 M/S
ECHO PV PEAK GRADIENT: 2 MMHG
ECHO RV FREE WALL PEAK S': 16.1 CM/S
ECHO RV INTERNAL DIMENSION: 4.8 CM
ECHO RV TAPSE: 1.7 CM (ref 1.7–?)
EOSINOPHIL # BLD: 0 K/UL (ref 0–0.4)
EOSINOPHIL NFR BLD: 0 % (ref 0–7)
ERYTHROCYTE [DISTWIDTH] IN BLOOD BY AUTOMATED COUNT: 13.3 % (ref 11.5–14.5)
ERYTHROCYTE [DISTWIDTH] IN BLOOD BY AUTOMATED COUNT: 13.6 % (ref 11.5–14.5)
GAS FLOW.O2 O2 DELIVERY SYS: ABNORMAL
GLUCOSE SERPL-MCNC: 127 MG/DL (ref 65–100)
HCO3 BLD-SCNC: 27.8 MMOL/L (ref 21–28)
HCT VFR BLD AUTO: 36.3 % (ref 36.6–50.3)
HCT VFR BLD AUTO: 37.6 % (ref 36.6–50.3)
HGB BLD-MCNC: 11.9 G/DL (ref 12.1–17)
HGB BLD-MCNC: 12.3 G/DL (ref 12.1–17)
IMM GRANULOCYTES # BLD AUTO: 0.1 K/UL (ref 0–0.04)
IMM GRANULOCYTES NFR BLD AUTO: 0 % (ref 0–0.5)
INR PPP: 1.3 (ref 0.9–1.1)
LDH SERPL L TO P-CCNC: 231 U/L (ref 85–241)
LYMPHOCYTES # BLD: 1.6 K/UL (ref 0.8–3.5)
LYMPHOCYTES NFR BLD: 13 % (ref 12–49)
MCH RBC QN AUTO: 31.8 PG (ref 26–34)
MCH RBC QN AUTO: 32.2 PG (ref 26–34)
MCHC RBC AUTO-ENTMCNC: 32.7 G/DL (ref 30–36.5)
MCHC RBC AUTO-ENTMCNC: 32.8 G/DL (ref 30–36.5)
MCV RBC AUTO: 97.1 FL (ref 80–99)
MCV RBC AUTO: 98.4 FL (ref 80–99)
MONOCYTES # BLD: 0.8 K/UL (ref 0–1)
MONOCYTES NFR BLD: 7 % (ref 5–13)
NEUTS SEG # BLD: 9.9 K/UL (ref 1.8–8)
NEUTS SEG NFR BLD: 80 % (ref 32–75)
NRBC # BLD: 0 K/UL (ref 0–0.01)
NRBC # BLD: 0 K/UL (ref 0–0.01)
NRBC BLD-RTO: 0 PER 100 WBC
NRBC BLD-RTO: 0 PER 100 WBC
NT PRO BNP: 4523 PG/ML
O2/TOTAL GAS SETTING VFR VENT: 5 %
PCO2 BLD: 45.5 MMHG (ref 35–48)
PH BLD: 7.39 (ref 7.35–7.45)
PLATELET # BLD AUTO: 170 K/UL (ref 150–400)
PLATELET # BLD AUTO: 181 K/UL (ref 150–400)
PMV BLD AUTO: 9.3 FL (ref 8.9–12.9)
PMV BLD AUTO: 9.4 FL (ref 8.9–12.9)
PO2 BLD: 56 MMHG (ref 83–108)
POTASSIUM SERPL-SCNC: 4.1 MMOL/L (ref 3.5–5.1)
PROTHROMBIN TIME: 13.2 SEC (ref 9–11.1)
RBC # BLD AUTO: 3.74 M/UL (ref 4.1–5.7)
RBC # BLD AUTO: 3.82 M/UL (ref 4.1–5.7)
SAO2 % BLD: 88.3 % (ref 92–97)
SODIUM SERPL-SCNC: 138 MMOL/L (ref 136–145)
SPECIMEN TYPE: ABNORMAL
THERAPEUTIC RANGE: ABNORMAL SECS (ref 58–77)
UFH PPP CHRO-ACNC: 0.11 IU/ML
WBC # BLD AUTO: 12.5 K/UL (ref 4.1–11.1)
WBC # BLD AUTO: 15.9 K/UL (ref 4.1–11.1)

## 2024-11-22 PROCEDURE — 6360000002 HC RX W HCPCS: Performed by: INTERNAL MEDICINE

## 2024-11-22 PROCEDURE — 71045 X-RAY EXAM CHEST 1 VIEW: CPT

## 2024-11-22 PROCEDURE — 85520 HEPARIN ASSAY: CPT

## 2024-11-22 PROCEDURE — C1729 CATH, DRAINAGE: HCPCS

## 2024-11-22 PROCEDURE — 36600 WITHDRAWAL OF ARTERIAL BLOOD: CPT

## 2024-11-22 PROCEDURE — 0W9930Z DRAINAGE OF RIGHT PLEURAL CAVITY WITH DRAINAGE DEVICE, PERCUTANEOUS APPROACH: ICD-10-PCS | Performed by: PHYSICIAN ASSISTANT

## 2024-11-22 PROCEDURE — 5A0955A ASSISTANCE WITH RESPIRATORY VENTILATION, GREATER THAN 96 CONSECUTIVE HOURS, HIGH NASAL FLOW/VELOCITY: ICD-10-PCS | Performed by: HOSPITALIST

## 2024-11-22 PROCEDURE — 85730 THROMBOPLASTIN TIME PARTIAL: CPT

## 2024-11-22 PROCEDURE — 2580000003 HC RX 258: Performed by: INTERNAL MEDICINE

## 2024-11-22 PROCEDURE — 94640 AIRWAY INHALATION TREATMENT: CPT

## 2024-11-22 PROCEDURE — 6370000000 HC RX 637 (ALT 250 FOR IP)

## 2024-11-22 PROCEDURE — 6370000000 HC RX 637 (ALT 250 FOR IP): Performed by: HOSPITALIST

## 2024-11-22 PROCEDURE — 2500000003 HC RX 250 WO HCPCS: Performed by: INTERNAL MEDICINE

## 2024-11-22 PROCEDURE — 2060000000 HC ICU INTERMEDIATE R&B

## 2024-11-22 PROCEDURE — 80048 BASIC METABOLIC PNL TOTAL CA: CPT

## 2024-11-22 PROCEDURE — 2700000000 HC OXYGEN THERAPY PER DAY

## 2024-11-22 PROCEDURE — 82803 BLOOD GASES ANY COMBINATION: CPT

## 2024-11-22 PROCEDURE — 6370000000 HC RX 637 (ALT 250 FOR IP): Performed by: INTERNAL MEDICINE

## 2024-11-22 PROCEDURE — 83615 LACTATE (LD) (LDH) ENZYME: CPT

## 2024-11-22 PROCEDURE — 93971 EXTREMITY STUDY: CPT

## 2024-11-22 PROCEDURE — 85025 COMPLETE CBC W/AUTO DIFF WBC: CPT

## 2024-11-22 PROCEDURE — 85610 PROTHROMBIN TIME: CPT

## 2024-11-22 PROCEDURE — 2580000003 HC RX 258: Performed by: HOSPITALIST

## 2024-11-22 PROCEDURE — 83880 ASSAY OF NATRIURETIC PEPTIDE: CPT

## 2024-11-22 PROCEDURE — 85027 COMPLETE CBC AUTOMATED: CPT

## 2024-11-22 PROCEDURE — 6360000002 HC RX W HCPCS: Performed by: HOSPITALIST

## 2024-11-22 PROCEDURE — 0W993ZZ DRAINAGE OF RIGHT PLEURAL CAVITY, PERCUTANEOUS APPROACH: ICD-10-PCS | Performed by: RADIOLOGY

## 2024-11-22 PROCEDURE — 94761 N-INVAS EAR/PLS OXIMETRY MLT: CPT

## 2024-11-22 PROCEDURE — 36415 COLL VENOUS BLD VENIPUNCTURE: CPT

## 2024-11-22 RX ORDER — FUROSEMIDE 10 MG/ML
20 INJECTION INTRAMUSCULAR; INTRAVENOUS 2 TIMES DAILY
Status: DISCONTINUED | OUTPATIENT
Start: 2024-11-22 | End: 2024-11-22

## 2024-11-22 RX ORDER — FUROSEMIDE 20 MG/1
20 TABLET ORAL DAILY
Status: DISCONTINUED | OUTPATIENT
Start: 2024-11-22 | End: 2024-11-22

## 2024-11-22 RX ORDER — HEPARIN SODIUM 1000 [USP'U]/ML
80 INJECTION, SOLUTION INTRAVENOUS; SUBCUTANEOUS PRN
Status: DISCONTINUED | OUTPATIENT
Start: 2024-11-22 | End: 2024-11-24

## 2024-11-22 RX ORDER — LIDOCAINE 4 G/G
1 PATCH TOPICAL DAILY
Status: DISCONTINUED | OUTPATIENT
Start: 2024-11-22 | End: 2024-12-01

## 2024-11-22 RX ORDER — LEVALBUTEROL INHALATION SOLUTION 1.25 MG/3ML
1.25 SOLUTION RESPIRATORY (INHALATION)
Status: DISCONTINUED | OUTPATIENT
Start: 2024-11-22 | End: 2024-11-23

## 2024-11-22 RX ORDER — LIDOCAINE HYDROCHLORIDE 10 MG/ML
10 INJECTION, SOLUTION EPIDURAL; INFILTRATION; INTRACAUDAL; PERINEURAL ONCE
Status: COMPLETED | OUTPATIENT
Start: 2024-11-22 | End: 2024-11-22

## 2024-11-22 RX ORDER — MORPHINE SULFATE 4 MG/ML
2 INJECTION, SOLUTION INTRAMUSCULAR; INTRAVENOUS EVERY 4 HOURS PRN
Status: DISCONTINUED | OUTPATIENT
Start: 2024-11-22 | End: 2024-11-27

## 2024-11-22 RX ORDER — HEPARIN SODIUM 10000 [USP'U]/100ML
5-30 INJECTION, SOLUTION INTRAVENOUS CONTINUOUS
Status: DISCONTINUED | OUTPATIENT
Start: 2024-11-22 | End: 2024-11-22

## 2024-11-22 RX ORDER — KETOROLAC TROMETHAMINE 15 MG/ML
15 INJECTION, SOLUTION INTRAMUSCULAR; INTRAVENOUS ONCE
Status: COMPLETED | OUTPATIENT
Start: 2024-11-22 | End: 2024-11-22

## 2024-11-22 RX ORDER — FUROSEMIDE 20 MG/1
20 TABLET ORAL DAILY
Status: DISCONTINUED | OUTPATIENT
Start: 2024-11-22 | End: 2024-11-23

## 2024-11-22 RX ORDER — HEPARIN SODIUM 1000 [USP'U]/ML
40 INJECTION, SOLUTION INTRAVENOUS; SUBCUTANEOUS PRN
Status: DISCONTINUED | OUTPATIENT
Start: 2024-11-22 | End: 2024-11-24

## 2024-11-22 RX ORDER — HEPARIN SODIUM 10000 [USP'U]/100ML
5-30 INJECTION, SOLUTION INTRAVENOUS CONTINUOUS
Status: DISCONTINUED | OUTPATIENT
Start: 2024-11-22 | End: 2024-11-24

## 2024-11-22 RX ORDER — IPRATROPIUM BROMIDE AND ALBUTEROL SULFATE 2.5; .5 MG/3ML; MG/3ML
1 SOLUTION RESPIRATORY (INHALATION)
Status: DISCONTINUED | OUTPATIENT
Start: 2024-11-22 | End: 2024-11-22

## 2024-11-22 RX ORDER — MIDODRINE HYDROCHLORIDE 5 MG/1
5 TABLET ORAL
Status: DISCONTINUED | OUTPATIENT
Start: 2024-11-22 | End: 2024-11-23

## 2024-11-22 RX ADMIN — PIPERACILLIN AND TAZOBACTAM 3375 MG: 3; .375 INJECTION, POWDER, LYOPHILIZED, FOR SOLUTION INTRAVENOUS at 16:56

## 2024-11-22 RX ADMIN — LEVALBUTEROL HYDROCHLORIDE 1.25 MG: 1.25 SOLUTION RESPIRATORY (INHALATION) at 12:00

## 2024-11-22 RX ADMIN — DOXYCYCLINE 100 MG: 100 INJECTION, POWDER, LYOPHILIZED, FOR SOLUTION INTRAVENOUS at 12:05

## 2024-11-22 RX ADMIN — IPRATROPIUM BROMIDE 0.5 MG: 0.5 SOLUTION RESPIRATORY (INHALATION) at 19:24

## 2024-11-22 RX ADMIN — Medication 9 MG: at 22:45

## 2024-11-22 RX ADMIN — OXYCODONE 5 MG: 5 TABLET ORAL at 17:08

## 2024-11-22 RX ADMIN — PIPERACILLIN AND TAZOBACTAM 4500 MG: 4; .5 INJECTION, POWDER, FOR SOLUTION INTRAVENOUS at 11:13

## 2024-11-22 RX ADMIN — IPRATROPIUM BROMIDE 0.5 MG: 0.5 SOLUTION RESPIRATORY (INHALATION) at 16:28

## 2024-11-22 RX ADMIN — LEVALBUTEROL HYDROCHLORIDE 1.25 MG: 1.25 SOLUTION RESPIRATORY (INHALATION) at 19:24

## 2024-11-22 RX ADMIN — DOXYCYCLINE HYCLATE 100 MG: 100 TABLET, COATED ORAL at 08:39

## 2024-11-22 RX ADMIN — DOXYCYCLINE 100 MG: 100 INJECTION, POWDER, LYOPHILIZED, FOR SOLUTION INTRAVENOUS at 22:59

## 2024-11-22 RX ADMIN — IPRATROPIUM BROMIDE 0.5 MG: 0.5 SOLUTION RESPIRATORY (INHALATION) at 12:00

## 2024-11-22 RX ADMIN — SODIUM CHLORIDE, PRESERVATIVE FREE 10 ML: 5 INJECTION INTRAVENOUS at 08:40

## 2024-11-22 RX ADMIN — SODIUM CHLORIDE, PRESERVATIVE FREE 10 ML: 5 INJECTION INTRAVENOUS at 08:41

## 2024-11-22 RX ADMIN — BUDESONIDE INHALATION 500 MCG: 0.5 SUSPENSION RESPIRATORY (INHALATION) at 07:04

## 2024-11-22 RX ADMIN — LEVALBUTEROL HYDROCHLORIDE 1.25 MG: 1.25 SOLUTION RESPIRATORY (INHALATION) at 16:28

## 2024-11-22 RX ADMIN — BUDESONIDE INHALATION 500 MCG: 0.5 SUSPENSION RESPIRATORY (INHALATION) at 19:29

## 2024-11-22 RX ADMIN — MIDODRINE HYDROCHLORIDE 5 MG: 5 TABLET ORAL at 12:36

## 2024-11-22 RX ADMIN — ALBUTEROL SULFATE 1.25 MG: 1.25 SOLUTION RESPIRATORY (INHALATION) at 02:49

## 2024-11-22 RX ADMIN — ASPIRIN 81 MG: 81 TABLET, CHEWABLE ORAL at 08:40

## 2024-11-22 RX ADMIN — QUETIAPINE FUMARATE 50 MG: 100 TABLET ORAL at 22:45

## 2024-11-22 RX ADMIN — ARFORMOTEROL TARTRATE 15 MCG: 15 SOLUTION RESPIRATORY (INHALATION) at 19:29

## 2024-11-22 RX ADMIN — OXYCODONE 5 MG: 5 TABLET ORAL at 07:31

## 2024-11-22 RX ADMIN — ARFORMOTEROL TARTRATE 15 MCG: 15 SOLUTION RESPIRATORY (INHALATION) at 07:03

## 2024-11-22 RX ADMIN — LIDOCAINE HYDROCHLORIDE 10 ML: 10 INJECTION, SOLUTION EPIDURAL; INFILTRATION; INTRACAUDAL; PERINEURAL at 17:54

## 2024-11-22 RX ADMIN — KETOROLAC TROMETHAMINE 15 MG: 15 INJECTION, SOLUTION INTRAMUSCULAR; INTRAVENOUS at 12:36

## 2024-11-22 RX ADMIN — PRAVASTATIN SODIUM 40 MG: 20 TABLET ORAL at 22:45

## 2024-11-22 RX ADMIN — MIDODRINE HYDROCHLORIDE 5 MG: 5 TABLET ORAL at 16:45

## 2024-11-22 RX ADMIN — ENOXAPARIN SODIUM 40 MG: 100 INJECTION SUBCUTANEOUS at 08:39

## 2024-11-22 RX ADMIN — WATER 40 MG: 1 INJECTION INTRAMUSCULAR; INTRAVENOUS; SUBCUTANEOUS at 22:45

## 2024-11-22 RX ADMIN — IPRATROPIUM BROMIDE AND ALBUTEROL SULFATE 1 DOSE: 2.5; .5 SOLUTION RESPIRATORY (INHALATION) at 06:55

## 2024-11-22 RX ADMIN — HEPARIN SODIUM 18 UNITS/KG/HR: 10000 INJECTION, SOLUTION INTRAVENOUS at 18:39

## 2024-11-22 RX ADMIN — WATER 40 MG: 1 INJECTION INTRAMUSCULAR; INTRAVENOUS; SUBCUTANEOUS at 11:58

## 2024-11-22 RX ADMIN — OXYCODONE 5 MG: 5 TABLET ORAL at 02:43

## 2024-11-22 RX ADMIN — SODIUM CHLORIDE 50 ML: 9 INJECTION, SOLUTION INTRAVENOUS at 22:57

## 2024-11-22 ASSESSMENT — PAIN DESCRIPTION - LOCATION
LOCATION: BACK;ABDOMEN
LOCATION: RIB CAGE
LOCATION: ABDOMEN
LOCATION: ABDOMEN;BACK

## 2024-11-22 ASSESSMENT — PAIN SCALES - GENERAL
PAINLEVEL_OUTOF10: 6
PAINLEVEL_OUTOF10: 6
PAINLEVEL_OUTOF10: 5
PAINLEVEL_OUTOF10: 5

## 2024-11-22 ASSESSMENT — PAIN DESCRIPTION - ORIENTATION
ORIENTATION: LEFT
ORIENTATION: RIGHT
ORIENTATION: LEFT

## 2024-11-22 ASSESSMENT — PAIN - FUNCTIONAL ASSESSMENT: PAIN_FUNCTIONAL_ASSESSMENT: ACTIVITIES ARE NOT PREVENTED

## 2024-11-22 NOTE — ACP (ADVANCE CARE PLANNING)
Pt seen and examined. See daily progress note. Discussed current issues at hand and concerns re: respiratory status. Discussed with pt and pt's son at bedside. Pt does have a durable POA. Further discussed whether pt would want things like intubation, CPR and shocks which he would NOT. Code status changed in chart. Will need to continue goals of care discussions if pt continues to decline at which point comfort measures may be appropriate. Time spent 16 minutes. Palliative care consult also placed.

## 2024-11-22 NOTE — CARE COORDINATION
Care Management Progress Note    Reason for Admission:   Pleural effusion [J90]  Elevated troponin [R79.89]  Acute on chronic respiratory failure with hypoxia [J96.21]  Acute hypoxemic respiratory failure [J96.01]  Acute on chronic congestive heart failure, unspecified heart failure type (HCC) [I50.9]         Patient Admission Status: Inpatient  RUR: 10%  Hospitalization in the last 30 days (Readmission):  No        Transition of care plan:  Clinical Updates:  Ongoing medical management.  Pleural effusion.  PNA.  Pulm following.  Currently on 7L NC (on home O2 at 2L).  IV abx.  IV steroids.    Discharge Plan:    Continue to follow PT/OT recs.  PT pending at this time.  OT note stating pending progress.    B.  Ansible referral completed by previous CM.      Date last IMM letter given: 11/22    Transport at discharge: Family      ___________________________________  JEROD Sanchez, RN-CM  [facility]- Care Management  Available via [employer]  11/22/2024  12:24 PM

## 2024-11-22 NOTE — PROCEDURES
Interventional Radiology Procedure Note        11/22/2024    Provider: Lo Smith PA-C  Supervising Physician: Chandler Milton MD      Pre-Procedure Diagnosis: large complex pleural effusion  Post-Procedure Diagnosis: large complex pleural effusion    Informed consent obtained    Procedure(s): US-guided placement of an 8F pigtail pleural drainage catheter (chest tube) at bedside. Secured with suture and StayFix, connected to PleurEvac with water seal. 50cc pleural fluid removed during procedure; fluid draining to PleurEvac chamber at close of procedure. CXR pending. Patient tolerated well.     Sedation: none  Specimens removed: none  Complications: none  Recomendations: post procedure CXR ordered    Discharge Disposition: fair      Full PACS report to follow    Lo Smith PA-C  Interventional Radiology

## 2024-11-23 LAB
ANION GAP SERPL CALC-SCNC: 5 MMOL/L (ref 2–12)
BUN SERPL-MCNC: 21 MG/DL (ref 6–20)
BUN/CREAT SERPL: 26 (ref 12–20)
CALCIUM SERPL-MCNC: 9.4 MG/DL (ref 8.5–10.1)
CHLORIDE SERPL-SCNC: 101 MMOL/L (ref 97–108)
CO2 SERPL-SCNC: 31 MMOL/L (ref 21–32)
COMMENT:: NORMAL
CREAT SERPL-MCNC: 0.82 MG/DL (ref 0.7–1.3)
ECHO BSA: 1.59 M2
GLUCOSE SERPL-MCNC: 152 MG/DL (ref 65–100)
NT PRO BNP: 4420 PG/ML
POTASSIUM SERPL-SCNC: 4.2 MMOL/L (ref 3.5–5.1)
SODIUM SERPL-SCNC: 137 MMOL/L (ref 136–145)
SPECIMEN HOLD: NORMAL
UFH PPP CHRO-ACNC: 0.16 IU/ML
UFH PPP CHRO-ACNC: 0.19 IU/ML
UFH PPP CHRO-ACNC: 0.32 IU/ML
UFH PPP CHRO-ACNC: <0.1 IU/ML

## 2024-11-23 PROCEDURE — 6370000000 HC RX 637 (ALT 250 FOR IP)

## 2024-11-23 PROCEDURE — 6370000000 HC RX 637 (ALT 250 FOR IP): Performed by: HOSPITALIST

## 2024-11-23 PROCEDURE — 85520 HEPARIN ASSAY: CPT

## 2024-11-23 PROCEDURE — 94640 AIRWAY INHALATION TREATMENT: CPT

## 2024-11-23 PROCEDURE — 2700000000 HC OXYGEN THERAPY PER DAY

## 2024-11-23 PROCEDURE — 2580000003 HC RX 258: Performed by: HOSPITALIST

## 2024-11-23 PROCEDURE — 6360000002 HC RX W HCPCS: Performed by: INTERNAL MEDICINE

## 2024-11-23 PROCEDURE — 6370000000 HC RX 637 (ALT 250 FOR IP): Performed by: INTERNAL MEDICINE

## 2024-11-23 PROCEDURE — 97110 THERAPEUTIC EXERCISES: CPT

## 2024-11-23 PROCEDURE — 2060000000 HC ICU INTERMEDIATE R&B

## 2024-11-23 PROCEDURE — 2500000003 HC RX 250 WO HCPCS: Performed by: INTERNAL MEDICINE

## 2024-11-23 PROCEDURE — 83880 ASSAY OF NATRIURETIC PEPTIDE: CPT

## 2024-11-23 PROCEDURE — 97530 THERAPEUTIC ACTIVITIES: CPT

## 2024-11-23 PROCEDURE — 36415 COLL VENOUS BLD VENIPUNCTURE: CPT

## 2024-11-23 PROCEDURE — 97162 PT EVAL MOD COMPLEX 30 MIN: CPT

## 2024-11-23 PROCEDURE — 2580000003 HC RX 258: Performed by: INTERNAL MEDICINE

## 2024-11-23 PROCEDURE — 80048 BASIC METABOLIC PNL TOTAL CA: CPT

## 2024-11-23 PROCEDURE — 94761 N-INVAS EAR/PLS OXIMETRY MLT: CPT

## 2024-11-23 PROCEDURE — 6360000002 HC RX W HCPCS: Performed by: NURSE PRACTITIONER

## 2024-11-23 RX ORDER — GUAIFENESIN 600 MG/1
600 TABLET, EXTENDED RELEASE ORAL 2 TIMES DAILY
Status: DISCONTINUED | OUTPATIENT
Start: 2024-11-23 | End: 2024-11-27

## 2024-11-23 RX ORDER — LEVALBUTEROL INHALATION SOLUTION 1.25 MG/3ML
1.25 SOLUTION RESPIRATORY (INHALATION)
Status: DISCONTINUED | OUTPATIENT
Start: 2024-11-23 | End: 2024-12-03 | Stop reason: HOSPADM

## 2024-11-23 RX ORDER — FUROSEMIDE 20 MG/1
20 TABLET ORAL DAILY
Status: DISCONTINUED | OUTPATIENT
Start: 2024-11-24 | End: 2024-12-03 | Stop reason: HOSPADM

## 2024-11-23 RX ORDER — MIDODRINE HYDROCHLORIDE 5 MG/1
10 TABLET ORAL
Status: DISCONTINUED | OUTPATIENT
Start: 2024-11-23 | End: 2024-12-03 | Stop reason: HOSPADM

## 2024-11-23 RX ORDER — FUROSEMIDE 20 MG/1
20 TABLET ORAL 2 TIMES DAILY
Status: DISCONTINUED | OUTPATIENT
Start: 2024-11-23 | End: 2024-11-23

## 2024-11-23 RX ADMIN — SODIUM CHLORIDE, PRESERVATIVE FREE 10 ML: 5 INJECTION INTRAVENOUS at 22:41

## 2024-11-23 RX ADMIN — Medication 9 MG: at 22:41

## 2024-11-23 RX ADMIN — QUETIAPINE FUMARATE 50 MG: 100 TABLET ORAL at 22:41

## 2024-11-23 RX ADMIN — GUAIFENESIN 600 MG: 600 TABLET ORAL at 22:40

## 2024-11-23 RX ADMIN — IPRATROPIUM BROMIDE 0.5 MG: 0.5 SOLUTION RESPIRATORY (INHALATION) at 18:10

## 2024-11-23 RX ADMIN — SODIUM CHLORIDE, PRESERVATIVE FREE 10 ML: 5 INJECTION INTRAVENOUS at 09:11

## 2024-11-23 RX ADMIN — PIPERACILLIN AND TAZOBACTAM 3375 MG: 3; .375 INJECTION, POWDER, LYOPHILIZED, FOR SOLUTION INTRAVENOUS at 01:58

## 2024-11-23 RX ADMIN — WATER 40 MG: 1 INJECTION INTRAMUSCULAR; INTRAVENOUS; SUBCUTANEOUS at 22:41

## 2024-11-23 RX ADMIN — ARFORMOTEROL TARTRATE 15 MCG: 15 SOLUTION RESPIRATORY (INHALATION) at 18:16

## 2024-11-23 RX ADMIN — FUROSEMIDE 20 MG: 20 TABLET ORAL at 09:08

## 2024-11-23 RX ADMIN — PIPERACILLIN AND TAZOBACTAM 3375 MG: 3; .375 INJECTION, POWDER, LYOPHILIZED, FOR SOLUTION INTRAVENOUS at 10:53

## 2024-11-23 RX ADMIN — PIPERACILLIN AND TAZOBACTAM 3375 MG: 3; .375 INJECTION, POWDER, LYOPHILIZED, FOR SOLUTION INTRAVENOUS at 18:21

## 2024-11-23 RX ADMIN — OXYCODONE 5 MG: 5 TABLET ORAL at 10:28

## 2024-11-23 RX ADMIN — PRAVASTATIN SODIUM 40 MG: 20 TABLET ORAL at 22:40

## 2024-11-23 RX ADMIN — DOXYCYCLINE 100 MG: 100 INJECTION, POWDER, LYOPHILIZED, FOR SOLUTION INTRAVENOUS at 15:28

## 2024-11-23 RX ADMIN — HEPARIN SODIUM 2300 UNITS: 1000 INJECTION INTRAVENOUS; SUBCUTANEOUS at 21:15

## 2024-11-23 RX ADMIN — MORPHINE SULFATE 2 MG: 4 INJECTION, SOLUTION INTRAMUSCULAR; INTRAVENOUS at 23:00

## 2024-11-23 RX ADMIN — LEVALBUTEROL HYDROCHLORIDE 1.25 MG: 1.25 SOLUTION RESPIRATORY (INHALATION) at 13:52

## 2024-11-23 RX ADMIN — BUDESONIDE INHALATION 500 MCG: 0.5 SUSPENSION RESPIRATORY (INHALATION) at 18:16

## 2024-11-23 RX ADMIN — LEVALBUTEROL HYDROCHLORIDE 1.25 MG: 1.25 SOLUTION RESPIRATORY (INHALATION) at 18:10

## 2024-11-23 RX ADMIN — MIDODRINE HYDROCHLORIDE 5 MG: 5 TABLET ORAL at 09:11

## 2024-11-23 RX ADMIN — GUAIFENESIN 600 MG: 600 TABLET ORAL at 11:45

## 2024-11-23 RX ADMIN — HEPARIN SODIUM 20 UNITS/KG/HR: 10000 INJECTION, SOLUTION INTRAVENOUS at 02:02

## 2024-11-23 RX ADMIN — BUDESONIDE INHALATION 500 MCG: 0.5 SUSPENSION RESPIRATORY (INHALATION) at 08:04

## 2024-11-23 RX ADMIN — HEPARIN SODIUM 20 UNITS/KG/HR: 10000 INJECTION, SOLUTION INTRAVENOUS at 19:16

## 2024-11-23 RX ADMIN — MIDODRINE HYDROCHLORIDE 10 MG: 5 TABLET ORAL at 11:45

## 2024-11-23 RX ADMIN — ARFORMOTEROL TARTRATE 15 MCG: 15 SOLUTION RESPIRATORY (INHALATION) at 08:04

## 2024-11-23 RX ADMIN — WATER 40 MG: 1 INJECTION INTRAMUSCULAR; INTRAVENOUS; SUBCUTANEOUS at 11:45

## 2024-11-23 RX ADMIN — IPRATROPIUM BROMIDE 0.5 MG: 0.5 SOLUTION RESPIRATORY (INHALATION) at 13:52

## 2024-11-23 RX ADMIN — LEVALBUTEROL HYDROCHLORIDE 1.25 MG: 1.25 SOLUTION RESPIRATORY (INHALATION) at 07:57

## 2024-11-23 RX ADMIN — IPRATROPIUM BROMIDE 0.5 MG: 0.5 SOLUTION RESPIRATORY (INHALATION) at 07:57

## 2024-11-23 RX ADMIN — HEPARIN SODIUM 2300 UNITS: 1000 INJECTION INTRAVENOUS; SUBCUTANEOUS at 02:06

## 2024-11-23 ASSESSMENT — PAIN DESCRIPTION - LOCATION
LOCATION: CHEST
LOCATION: FLANK

## 2024-11-23 ASSESSMENT — PAIN SCALES - GENERAL
PAINLEVEL_OUTOF10: 8
PAINLEVEL_OUTOF10: 6
PAINLEVEL_OUTOF10: 4

## 2024-11-23 ASSESSMENT — PAIN DESCRIPTION - ORIENTATION: ORIENTATION: RIGHT;POSTERIOR

## 2024-11-23 ASSESSMENT — PAIN DESCRIPTION - DESCRIPTORS: DESCRIPTORS: PRESSURE

## 2024-11-24 ENCOUNTER — APPOINTMENT (OUTPATIENT)
Facility: HOSPITAL | Age: 79
DRG: 180 | End: 2024-11-24
Payer: MEDICARE

## 2024-11-24 LAB
ACID FAST STN SPEC: NEGATIVE
ANION GAP SERPL CALC-SCNC: 4 MMOL/L (ref 2–12)
BASOPHILS # BLD: 0 K/UL (ref 0–0.1)
BASOPHILS NFR BLD: 0 % (ref 0–1)
BUN SERPL-MCNC: 19 MG/DL (ref 6–20)
BUN/CREAT SERPL: 28 (ref 12–20)
CALCIUM SERPL-MCNC: 10.2 MG/DL (ref 8.5–10.1)
CHLORIDE SERPL-SCNC: 102 MMOL/L (ref 97–108)
CO2 SERPL-SCNC: 31 MMOL/L (ref 21–32)
CREAT SERPL-MCNC: 0.67 MG/DL (ref 0.7–1.3)
DIFFERENTIAL METHOD BLD: ABNORMAL
EOSINOPHIL # BLD: 0 K/UL (ref 0–0.4)
EOSINOPHIL NFR BLD: 0 % (ref 0–7)
ERYTHROCYTE [DISTWIDTH] IN BLOOD BY AUTOMATED COUNT: 13.1 % (ref 11.5–14.5)
GLUCOSE SERPL-MCNC: 136 MG/DL (ref 65–100)
HCT VFR BLD AUTO: 32.1 % (ref 36.6–50.3)
HGB BLD-MCNC: 10.5 G/DL (ref 12.1–17)
IMM GRANULOCYTES # BLD AUTO: 0.1 K/UL (ref 0–0.04)
IMM GRANULOCYTES NFR BLD AUTO: 1 % (ref 0–0.5)
LYMPHOCYTES # BLD: 0.4 K/UL (ref 0.8–3.5)
LYMPHOCYTES NFR BLD: 4 % (ref 12–49)
MAGNESIUM SERPL-MCNC: 2.1 MG/DL (ref 1.6–2.4)
MCH RBC QN AUTO: 31.7 PG (ref 26–34)
MCHC RBC AUTO-ENTMCNC: 32.7 G/DL (ref 30–36.5)
MCV RBC AUTO: 97 FL (ref 80–99)
MONOCYTES # BLD: 0.3 K/UL (ref 0–1)
MONOCYTES NFR BLD: 3 % (ref 5–13)
MYCOBACTERIUM SPEC QL CULT: NORMAL
NEUTS SEG # BLD: 9.7 K/UL (ref 1.8–8)
NEUTS SEG NFR BLD: 92 % (ref 32–75)
NRBC # BLD: 0 K/UL (ref 0–0.01)
NRBC BLD-RTO: 0 PER 100 WBC
PLATELET # BLD AUTO: 176 K/UL (ref 150–400)
PMV BLD AUTO: 9.8 FL (ref 8.9–12.9)
POTASSIUM SERPL-SCNC: 3.8 MMOL/L (ref 3.5–5.1)
RBC # BLD AUTO: 3.31 M/UL (ref 4.1–5.7)
RBC MORPH BLD: ABNORMAL
SODIUM SERPL-SCNC: 137 MMOL/L (ref 136–145)
SPECIMEN PREPARATION: NORMAL
SPECIMEN SOURCE: NORMAL
UFH PPP CHRO-ACNC: 0.29 IU/ML
UFH PPP CHRO-ACNC: 0.3 IU/ML
UFH PPP CHRO-ACNC: 0.34 IU/ML
WBC # BLD AUTO: 10.5 K/UL (ref 4.1–11.1)

## 2024-11-24 PROCEDURE — 85520 HEPARIN ASSAY: CPT

## 2024-11-24 PROCEDURE — 71275 CT ANGIOGRAPHY CHEST: CPT

## 2024-11-24 PROCEDURE — 2580000003 HC RX 258: Performed by: HOSPITALIST

## 2024-11-24 PROCEDURE — 94761 N-INVAS EAR/PLS OXIMETRY MLT: CPT

## 2024-11-24 PROCEDURE — 36415 COLL VENOUS BLD VENIPUNCTURE: CPT

## 2024-11-24 PROCEDURE — 6360000002 HC RX W HCPCS: Performed by: HOSPITALIST

## 2024-11-24 PROCEDURE — 6370000000 HC RX 637 (ALT 250 FOR IP): Performed by: HOSPITALIST

## 2024-11-24 PROCEDURE — 6370000000 HC RX 637 (ALT 250 FOR IP): Performed by: INTERNAL MEDICINE

## 2024-11-24 PROCEDURE — 6370000000 HC RX 637 (ALT 250 FOR IP)

## 2024-11-24 PROCEDURE — 80048 BASIC METABOLIC PNL TOTAL CA: CPT

## 2024-11-24 PROCEDURE — 2580000003 HC RX 258: Performed by: INTERNAL MEDICINE

## 2024-11-24 PROCEDURE — 2060000000 HC ICU INTERMEDIATE R&B

## 2024-11-24 PROCEDURE — 71045 X-RAY EXAM CHEST 1 VIEW: CPT

## 2024-11-24 PROCEDURE — 2500000003 HC RX 250 WO HCPCS: Performed by: INTERNAL MEDICINE

## 2024-11-24 PROCEDURE — 85025 COMPLETE CBC W/AUTO DIFF WBC: CPT

## 2024-11-24 PROCEDURE — 6360000002 HC RX W HCPCS: Performed by: INTERNAL MEDICINE

## 2024-11-24 PROCEDURE — 83735 ASSAY OF MAGNESIUM: CPT

## 2024-11-24 PROCEDURE — 94640 AIRWAY INHALATION TREATMENT: CPT

## 2024-11-24 PROCEDURE — 6360000002 HC RX W HCPCS: Performed by: NURSE PRACTITIONER

## 2024-11-24 PROCEDURE — 2700000000 HC OXYGEN THERAPY PER DAY

## 2024-11-24 PROCEDURE — 6360000004 HC RX CONTRAST MEDICATION: Performed by: INTERNAL MEDICINE

## 2024-11-24 RX ORDER — IOPAMIDOL 755 MG/ML
100 INJECTION, SOLUTION INTRAVASCULAR
Status: COMPLETED | OUTPATIENT
Start: 2024-11-24 | End: 2024-11-24

## 2024-11-24 RX ORDER — FUROSEMIDE 10 MG/ML
20 INJECTION INTRAMUSCULAR; INTRAVENOUS ONCE
Status: COMPLETED | OUTPATIENT
Start: 2024-11-24 | End: 2024-11-24

## 2024-11-24 RX ORDER — DOXYCYCLINE HYCLATE 100 MG
100 TABLET ORAL EVERY 12 HOURS SCHEDULED
Status: DISPENSED | OUTPATIENT
Start: 2024-11-24 | End: 2024-11-27

## 2024-11-24 RX ADMIN — PIPERACILLIN AND TAZOBACTAM 3375 MG: 3; .375 INJECTION, POWDER, LYOPHILIZED, FOR SOLUTION INTRAVENOUS at 11:17

## 2024-11-24 RX ADMIN — BUDESONIDE INHALATION 500 MCG: 0.5 SUSPENSION RESPIRATORY (INHALATION) at 18:33

## 2024-11-24 RX ADMIN — SODIUM CHLORIDE, PRESERVATIVE FREE 10 ML: 5 INJECTION INTRAVENOUS at 22:47

## 2024-11-24 RX ADMIN — GUAIFENESIN 600 MG: 600 TABLET ORAL at 08:35

## 2024-11-24 RX ADMIN — IPRATROPIUM BROMIDE 0.5 MG: 0.5 SOLUTION RESPIRATORY (INHALATION) at 07:38

## 2024-11-24 RX ADMIN — IOPAMIDOL 56 ML: 755 INJECTION, SOLUTION INTRAVENOUS at 10:26

## 2024-11-24 RX ADMIN — MORPHINE SULFATE 2 MG: 4 INJECTION, SOLUTION INTRAMUSCULAR; INTRAVENOUS at 23:01

## 2024-11-24 RX ADMIN — GUAIFENESIN 600 MG: 600 TABLET ORAL at 22:46

## 2024-11-24 RX ADMIN — Medication 9 MG: at 22:46

## 2024-11-24 RX ADMIN — WATER 40 MG: 1 INJECTION INTRAMUSCULAR; INTRAVENOUS; SUBCUTANEOUS at 11:21

## 2024-11-24 RX ADMIN — LEVALBUTEROL HYDROCHLORIDE 1.25 MG: 1.25 SOLUTION RESPIRATORY (INHALATION) at 18:33

## 2024-11-24 RX ADMIN — BUDESONIDE INHALATION 500 MCG: 0.5 SUSPENSION RESPIRATORY (INHALATION) at 07:43

## 2024-11-24 RX ADMIN — IPRATROPIUM BROMIDE 0.5 MG: 0.5 SOLUTION RESPIRATORY (INHALATION) at 18:33

## 2024-11-24 RX ADMIN — LEVALBUTEROL HYDROCHLORIDE 1.25 MG: 1.25 SOLUTION RESPIRATORY (INHALATION) at 13:47

## 2024-11-24 RX ADMIN — ARFORMOTEROL TARTRATE 15 MCG: 15 SOLUTION RESPIRATORY (INHALATION) at 18:33

## 2024-11-24 RX ADMIN — DOXYCYCLINE HYCLATE 100 MG: 100 TABLET, COATED ORAL at 22:46

## 2024-11-24 RX ADMIN — DOXYCYCLINE 100 MG: 100 INJECTION, POWDER, LYOPHILIZED, FOR SOLUTION INTRAVENOUS at 01:48

## 2024-11-24 RX ADMIN — PIPERACILLIN AND TAZOBACTAM 3375 MG: 3; .375 INJECTION, POWDER, LYOPHILIZED, FOR SOLUTION INTRAVENOUS at 03:19

## 2024-11-24 RX ADMIN — PIPERACILLIN AND TAZOBACTAM 3375 MG: 3; .375 INJECTION, POWDER, LYOPHILIZED, FOR SOLUTION INTRAVENOUS at 17:29

## 2024-11-24 RX ADMIN — PRAVASTATIN SODIUM 40 MG: 20 TABLET ORAL at 22:46

## 2024-11-24 RX ADMIN — LEVALBUTEROL HYDROCHLORIDE 1.25 MG: 1.25 SOLUTION RESPIRATORY (INHALATION) at 07:38

## 2024-11-24 RX ADMIN — ARFORMOTEROL TARTRATE 15 MCG: 15 SOLUTION RESPIRATORY (INHALATION) at 07:43

## 2024-11-24 RX ADMIN — FUROSEMIDE 20 MG: 20 TABLET ORAL at 08:36

## 2024-11-24 RX ADMIN — WATER 40 MG: 1 INJECTION INTRAMUSCULAR; INTRAVENOUS; SUBCUTANEOUS at 22:47

## 2024-11-24 RX ADMIN — IPRATROPIUM BROMIDE 0.5 MG: 0.5 SOLUTION RESPIRATORY (INHALATION) at 13:47

## 2024-11-24 RX ADMIN — PANTOPRAZOLE SODIUM 40 MG: 40 TABLET, DELAYED RELEASE ORAL at 08:35

## 2024-11-24 RX ADMIN — QUETIAPINE FUMARATE 50 MG: 100 TABLET ORAL at 22:46

## 2024-11-24 RX ADMIN — FUROSEMIDE 20 MG: 10 INJECTION, SOLUTION INTRAMUSCULAR; INTRAVENOUS at 14:47

## 2024-11-24 ASSESSMENT — PAIN SCALES - GENERAL: PAINLEVEL_OUTOF10: 7

## 2024-11-24 ASSESSMENT — PAIN DESCRIPTION - ORIENTATION: ORIENTATION: RIGHT

## 2024-11-24 ASSESSMENT — PAIN DESCRIPTION - LOCATION: LOCATION: BACK

## 2024-11-24 ASSESSMENT — PAIN DESCRIPTION - DESCRIPTORS: DESCRIPTORS: PRESSURE

## 2024-11-25 ENCOUNTER — APPOINTMENT (OUTPATIENT)
Facility: HOSPITAL | Age: 79
DRG: 180 | End: 2024-11-25
Payer: MEDICARE

## 2024-11-25 LAB
ANION GAP SERPL CALC-SCNC: 4 MMOL/L (ref 2–12)
APTT PPP: 25.6 SEC (ref 22.1–31)
BACTERIA SPEC CULT: NORMAL
BACTERIA SPEC CULT: NORMAL
BUN SERPL-MCNC: 21 MG/DL (ref 6–20)
BUN/CREAT SERPL: 26 (ref 12–20)
CALCIUM SERPL-MCNC: 9.9 MG/DL (ref 8.5–10.1)
CHLORIDE SERPL-SCNC: 105 MMOL/L (ref 97–108)
CO2 SERPL-SCNC: 31 MMOL/L (ref 21–32)
CREAT SERPL-MCNC: 0.8 MG/DL (ref 0.7–1.3)
ERYTHROCYTE [DISTWIDTH] IN BLOOD BY AUTOMATED COUNT: 13.6 % (ref 11.5–14.5)
GLUCOSE SERPL-MCNC: 132 MG/DL (ref 65–100)
GRAM STN SPEC: NORMAL
GRAM STN SPEC: NORMAL
HCT VFR BLD AUTO: 35.1 % (ref 36.6–50.3)
HGB BLD-MCNC: 11.3 G/DL (ref 12.1–17)
INR PPP: 1.1 (ref 0.9–1.1)
MCH RBC QN AUTO: 31.7 PG (ref 26–34)
MCHC RBC AUTO-ENTMCNC: 32.2 G/DL (ref 30–36.5)
MCV RBC AUTO: 98.3 FL (ref 80–99)
NRBC # BLD: 0.02 K/UL (ref 0–0.01)
NRBC BLD-RTO: 0.2 PER 100 WBC
PLATELET # BLD AUTO: 244 K/UL (ref 150–400)
PMV BLD AUTO: 9.9 FL (ref 8.9–12.9)
POTASSIUM SERPL-SCNC: 3.8 MMOL/L (ref 3.5–5.1)
PROTHROMBIN TIME: 11.4 SEC (ref 9–11.1)
RBC # BLD AUTO: 3.57 M/UL (ref 4.1–5.7)
SERVICE CMNT-IMP: NORMAL
SODIUM SERPL-SCNC: 140 MMOL/L (ref 136–145)
THERAPEUTIC RANGE: NORMAL SECS (ref 58–77)
UFH PPP CHRO-ACNC: <0.1 IU/ML
WBC # BLD AUTO: 9.6 K/UL (ref 4.1–11.1)

## 2024-11-25 PROCEDURE — 6370000000 HC RX 637 (ALT 250 FOR IP): Performed by: INTERNAL MEDICINE

## 2024-11-25 PROCEDURE — 80048 BASIC METABOLIC PNL TOTAL CA: CPT

## 2024-11-25 PROCEDURE — 6370000000 HC RX 637 (ALT 250 FOR IP): Performed by: HOSPITALIST

## 2024-11-25 PROCEDURE — 94640 AIRWAY INHALATION TREATMENT: CPT

## 2024-11-25 PROCEDURE — 6360000002 HC RX W HCPCS: Performed by: INTERNAL MEDICINE

## 2024-11-25 PROCEDURE — 36415 COLL VENOUS BLD VENIPUNCTURE: CPT

## 2024-11-25 PROCEDURE — 85520 HEPARIN ASSAY: CPT

## 2024-11-25 PROCEDURE — 80069 RENAL FUNCTION PANEL: CPT

## 2024-11-25 PROCEDURE — 6360000002 HC RX W HCPCS: Performed by: NURSE PRACTITIONER

## 2024-11-25 PROCEDURE — 85025 COMPLETE CBC W/AUTO DIFF WBC: CPT

## 2024-11-25 PROCEDURE — 6360000004 HC RX CONTRAST MEDICATION

## 2024-11-25 PROCEDURE — 2060000000 HC ICU INTERMEDIATE R&B

## 2024-11-25 PROCEDURE — 85027 COMPLETE CBC AUTOMATED: CPT

## 2024-11-25 PROCEDURE — 2700000000 HC OXYGEN THERAPY PER DAY

## 2024-11-25 PROCEDURE — 2580000003 HC RX 258: Performed by: HOSPITALIST

## 2024-11-25 PROCEDURE — 94761 N-INVAS EAR/PLS OXIMETRY MLT: CPT

## 2024-11-25 PROCEDURE — 2580000003 HC RX 258: Performed by: INTERNAL MEDICINE

## 2024-11-25 PROCEDURE — 71045 X-RAY EXAM CHEST 1 VIEW: CPT

## 2024-11-25 PROCEDURE — 74174 CTA ABD&PLVS W/CONTRAST: CPT

## 2024-11-25 PROCEDURE — 99223 1ST HOSP IP/OBS HIGH 75: CPT | Performed by: NURSE PRACTITIONER

## 2024-11-25 PROCEDURE — 85730 THROMBOPLASTIN TIME PARTIAL: CPT

## 2024-11-25 PROCEDURE — 6370000000 HC RX 637 (ALT 250 FOR IP): Performed by: NURSE PRACTITIONER

## 2024-11-25 PROCEDURE — 85610 PROTHROMBIN TIME: CPT

## 2024-11-25 PROCEDURE — 6370000000 HC RX 637 (ALT 250 FOR IP)

## 2024-11-25 RX ORDER — HEPARIN SODIUM 1000 [USP'U]/ML
80 INJECTION, SOLUTION INTRAVENOUS; SUBCUTANEOUS ONCE
Status: COMPLETED | OUTPATIENT
Start: 2024-11-25 | End: 2024-11-25

## 2024-11-25 RX ORDER — HEPARIN SODIUM 1000 [USP'U]/ML
60 INJECTION, SOLUTION INTRAVENOUS; SUBCUTANEOUS ONCE
Status: DISCONTINUED | OUTPATIENT
Start: 2024-11-25 | End: 2024-11-25

## 2024-11-25 RX ORDER — HEPARIN SODIUM 1000 [USP'U]/ML
60 INJECTION, SOLUTION INTRAVENOUS; SUBCUTANEOUS PRN
Status: DISCONTINUED | OUTPATIENT
Start: 2024-11-25 | End: 2024-11-25

## 2024-11-25 RX ORDER — HEPARIN SODIUM 1000 [USP'U]/ML
40 INJECTION, SOLUTION INTRAVENOUS; SUBCUTANEOUS PRN
Status: DISCONTINUED | OUTPATIENT
Start: 2024-11-25 | End: 2024-12-01

## 2024-11-25 RX ORDER — OXYCODONE HYDROCHLORIDE 5 MG/1
10 TABLET ORAL EVERY 4 HOURS PRN
Status: DISCONTINUED | OUTPATIENT
Start: 2024-11-25 | End: 2024-12-03 | Stop reason: HOSPADM

## 2024-11-25 RX ORDER — HEPARIN SODIUM 1000 [USP'U]/ML
30 INJECTION, SOLUTION INTRAVENOUS; SUBCUTANEOUS PRN
Status: DISCONTINUED | OUTPATIENT
Start: 2024-11-25 | End: 2024-11-25

## 2024-11-25 RX ORDER — OXYCODONE HYDROCHLORIDE 5 MG/1
10 TABLET ORAL ONCE
Status: COMPLETED | OUTPATIENT
Start: 2024-11-25 | End: 2024-11-25

## 2024-11-25 RX ORDER — HEPARIN SODIUM 10000 [USP'U]/100ML
5-30 INJECTION, SOLUTION INTRAVENOUS CONTINUOUS
Status: DISCONTINUED | OUTPATIENT
Start: 2024-11-25 | End: 2024-12-01

## 2024-11-25 RX ORDER — HEPARIN SODIUM 10000 [USP'U]/100ML
5-30 INJECTION, SOLUTION INTRAVENOUS CONTINUOUS
Status: DISCONTINUED | OUTPATIENT
Start: 2024-11-25 | End: 2024-11-25

## 2024-11-25 RX ORDER — HEPARIN SODIUM 1000 [USP'U]/ML
80 INJECTION, SOLUTION INTRAVENOUS; SUBCUTANEOUS PRN
Status: DISCONTINUED | OUTPATIENT
Start: 2024-11-25 | End: 2024-12-01

## 2024-11-25 RX ORDER — IOPAMIDOL 755 MG/ML
100 INJECTION, SOLUTION INTRAVASCULAR
Status: COMPLETED | OUTPATIENT
Start: 2024-11-25 | End: 2024-11-25

## 2024-11-25 RX ADMIN — SODIUM CHLORIDE, PRESERVATIVE FREE 10 ML: 5 INJECTION INTRAVENOUS at 20:41

## 2024-11-25 RX ADMIN — IOPAMIDOL 100 ML: 755 INJECTION, SOLUTION INTRAVENOUS at 15:53

## 2024-11-25 RX ADMIN — LEVALBUTEROL HYDROCHLORIDE 1.25 MG: 1.25 SOLUTION RESPIRATORY (INHALATION) at 14:15

## 2024-11-25 RX ADMIN — DOXYCYCLINE HYCLATE 100 MG: 100 TABLET, COATED ORAL at 20:39

## 2024-11-25 RX ADMIN — Medication 9 MG: at 20:39

## 2024-11-25 RX ADMIN — PANTOPRAZOLE SODIUM 40 MG: 40 TABLET, DELAYED RELEASE ORAL at 08:24

## 2024-11-25 RX ADMIN — IPRATROPIUM BROMIDE 0.5 MG: 0.5 SOLUTION RESPIRATORY (INHALATION) at 14:15

## 2024-11-25 RX ADMIN — OXYCODONE 10 MG: 5 TABLET ORAL at 14:32

## 2024-11-25 RX ADMIN — PIPERACILLIN AND TAZOBACTAM 3375 MG: 3; .375 INJECTION, POWDER, LYOPHILIZED, FOR SOLUTION INTRAVENOUS at 10:15

## 2024-11-25 RX ADMIN — PIPERACILLIN AND TAZOBACTAM 3375 MG: 3; .375 INJECTION, POWDER, LYOPHILIZED, FOR SOLUTION INTRAVENOUS at 02:19

## 2024-11-25 RX ADMIN — PIPERACILLIN AND TAZOBACTAM 3375 MG: 3; .375 INJECTION, POWDER, LYOPHILIZED, FOR SOLUTION INTRAVENOUS at 18:05

## 2024-11-25 RX ADMIN — WATER 40 MG: 1 INJECTION INTRAMUSCULAR; INTRAVENOUS; SUBCUTANEOUS at 23:44

## 2024-11-25 RX ADMIN — ARFORMOTEROL TARTRATE 15 MCG: 15 SOLUTION RESPIRATORY (INHALATION) at 19:27

## 2024-11-25 RX ADMIN — BUDESONIDE INHALATION 500 MCG: 0.5 SUSPENSION RESPIRATORY (INHALATION) at 19:26

## 2024-11-25 RX ADMIN — SODIUM CHLORIDE, PRESERVATIVE FREE 10 ML: 5 INJECTION INTRAVENOUS at 08:22

## 2024-11-25 RX ADMIN — HEPARIN SODIUM 18 UNITS/KG/HR: 10000 INJECTION, SOLUTION INTRAVENOUS at 18:11

## 2024-11-25 RX ADMIN — GUAIFENESIN 600 MG: 600 TABLET ORAL at 08:24

## 2024-11-25 RX ADMIN — WATER 40 MG: 1 INJECTION INTRAMUSCULAR; INTRAVENOUS; SUBCUTANEOUS at 10:11

## 2024-11-25 RX ADMIN — ARFORMOTEROL TARTRATE 15 MCG: 15 SOLUTION RESPIRATORY (INHALATION) at 07:46

## 2024-11-25 RX ADMIN — LEVALBUTEROL HYDROCHLORIDE 1.25 MG: 1.25 SOLUTION RESPIRATORY (INHALATION) at 07:46

## 2024-11-25 RX ADMIN — FUROSEMIDE 20 MG: 20 TABLET ORAL at 08:23

## 2024-11-25 RX ADMIN — QUETIAPINE FUMARATE 50 MG: 100 TABLET ORAL at 20:39

## 2024-11-25 RX ADMIN — GUAIFENESIN 600 MG: 600 TABLET ORAL at 20:38

## 2024-11-25 RX ADMIN — IPRATROPIUM BROMIDE 0.5 MG: 0.5 SOLUTION RESPIRATORY (INHALATION) at 07:46

## 2024-11-25 RX ADMIN — HEPARIN SODIUM 4600 UNITS: 1000 INJECTION INTRAVENOUS; SUBCUTANEOUS at 18:09

## 2024-11-25 RX ADMIN — DOXYCYCLINE HYCLATE 100 MG: 100 TABLET, COATED ORAL at 08:24

## 2024-11-25 RX ADMIN — IPRATROPIUM BROMIDE 0.5 MG: 0.5 SOLUTION RESPIRATORY (INHALATION) at 19:26

## 2024-11-25 RX ADMIN — PRAVASTATIN SODIUM 40 MG: 20 TABLET ORAL at 20:39

## 2024-11-25 RX ADMIN — BUDESONIDE INHALATION 500 MCG: 0.5 SUSPENSION RESPIRATORY (INHALATION) at 07:46

## 2024-11-25 ASSESSMENT — PAIN DESCRIPTION - DESCRIPTORS
DESCRIPTORS: DISCOMFORT

## 2024-11-25 ASSESSMENT — PAIN SCALES - GENERAL
PAINLEVEL_OUTOF10: 0
PAINLEVEL_OUTOF10: 5
PAINLEVEL_OUTOF10: 3
PAINLEVEL_OUTOF10: 5

## 2024-11-25 ASSESSMENT — PAIN DESCRIPTION - LOCATION: LOCATION: BACK

## 2024-11-25 ASSESSMENT — PAIN DESCRIPTION - ORIENTATION: ORIENTATION: RIGHT;POSTERIOR

## 2024-11-25 NOTE — CARE COORDINATION
3:56 PM  11/25/24    Care Management Progress Note    Reason for Admission:   Pleural effusion [J90]  Elevated troponin [R79.89]  Acute on chronic respiratory failure with hypoxia [J96.21]  Acute hypoxemic respiratory failure [J96.01]  Acute on chronic congestive heart failure, unspecified heart failure type (HCC) [I50.9]         Patient Admission Status: Inpatient  Date Admitted to INP: 11/20/24   RUR: Readmission Risk Score: 11.2    Hospitalization in the last 30 days (Readmission):  No        Transition of care plan:  Ongoing medical management- pt discussed during IDR; pulmonology and therapy following. Pt on 7L oxygen.  Anticipated discharge plan: TBD pending clinical progression, pt may need outpatient pulmonary rehab. Pt has home oxygen with [facility], 2L is baseline.  Date IM given: 11/20   Outpatient follow-up.  Discharge transport: TBD.      Ansible consult was previously sent via [employer]; CM sent H&P and updated clinicals as requested.    CM will continue to follow and assist with discharge needs.    ISABEL Ochoa

## 2024-11-26 ENCOUNTER — APPOINTMENT (OUTPATIENT)
Facility: HOSPITAL | Age: 79
DRG: 180 | End: 2024-11-26
Payer: MEDICARE

## 2024-11-26 PROBLEM — R06.02 SHORTNESS OF BREATH: Status: ACTIVE | Noted: 2024-11-26

## 2024-11-26 PROBLEM — J96.21 ACUTE ON CHRONIC RESPIRATORY FAILURE WITH HYPOXIA: Status: ACTIVE | Noted: 2024-11-26

## 2024-11-26 PROBLEM — Z51.5 PALLIATIVE CARE ENCOUNTER: Status: ACTIVE | Noted: 2024-11-26

## 2024-11-26 PROBLEM — Z71.89 GOALS OF CARE, COUNSELING/DISCUSSION: Status: ACTIVE | Noted: 2024-11-26

## 2024-11-26 LAB
ALBUMIN SERPL-MCNC: 2.1 G/DL (ref 3.5–5)
ALBUMIN SERPL-MCNC: 2.3 G/DL (ref 3.5–5)
ALBUMIN/GLOB SERPL: 0.6 (ref 1.1–2.2)
ALP SERPL-CCNC: 74 U/L (ref 45–117)
ALT SERPL-CCNC: 105 U/L (ref 12–78)
ANION GAP SERPL CALC-SCNC: 7 MMOL/L (ref 2–12)
AST SERPL-CCNC: 69 U/L (ref 15–37)
BASOPHILS # BLD: 0 K/UL (ref 0–0.1)
BASOPHILS NFR BLD: 0 % (ref 0–1)
BILIRUB DIRECT SERPL-MCNC: 0.1 MG/DL (ref 0–0.2)
BILIRUB SERPL-MCNC: 0.4 MG/DL (ref 0.2–1)
BUN SERPL-MCNC: 21 MG/DL (ref 6–20)
BUN/CREAT SERPL: 21 (ref 12–20)
CALCIUM SERPL-MCNC: 9.6 MG/DL (ref 8.5–10.1)
CHLORIDE SERPL-SCNC: 103 MMOL/L (ref 97–108)
CO2 SERPL-SCNC: 30 MMOL/L (ref 21–32)
CREAT SERPL-MCNC: 0.98 MG/DL (ref 0.7–1.3)
DIFFERENTIAL METHOD BLD: ABNORMAL
EOSINOPHIL # BLD: 0 K/UL (ref 0–0.4)
EOSINOPHIL NFR BLD: 0 % (ref 0–7)
ERYTHROCYTE [DISTWIDTH] IN BLOOD BY AUTOMATED COUNT: 13.5 % (ref 11.5–14.5)
FERRITIN SERPL-MCNC: 514 NG/ML (ref 26–388)
FOLATE SERPL-MCNC: 6.9 NG/ML (ref 5–21)
GLOBULIN SER CALC-MCNC: 4 G/DL (ref 2–4)
GLUCOSE SERPL-MCNC: 145 MG/DL (ref 65–100)
HCT VFR BLD AUTO: 31.3 % (ref 36.6–50.3)
HGB BLD-MCNC: 10.1 G/DL (ref 12.1–17)
IMM GRANULOCYTES # BLD AUTO: 0.1 K/UL (ref 0–0.04)
IMM GRANULOCYTES NFR BLD AUTO: 1 % (ref 0–0.5)
INR PPP: 1.2 (ref 0.9–1.1)
IRON SATN MFR SERPL: 37 % (ref 20–50)
IRON SERPL-MCNC: 97 UG/DL (ref 35–150)
LYMPHOCYTES # BLD: 0.7 K/UL (ref 0.8–3.5)
LYMPHOCYTES NFR BLD: 8 % (ref 12–49)
MAGNESIUM SERPL-MCNC: 2.1 MG/DL (ref 1.6–2.4)
MCH RBC QN AUTO: 31.5 PG (ref 26–34)
MCHC RBC AUTO-ENTMCNC: 32.3 G/DL (ref 30–36.5)
MCV RBC AUTO: 97.5 FL (ref 80–99)
MONOCYTES # BLD: 0.6 K/UL (ref 0–1)
MONOCYTES NFR BLD: 7 % (ref 5–13)
NEUTS SEG # BLD: 7.3 K/UL (ref 1.8–8)
NEUTS SEG NFR BLD: 84 % (ref 32–75)
NRBC # BLD: 0 K/UL (ref 0–0.01)
NRBC BLD-RTO: 0 PER 100 WBC
PHOSPHATE SERPL-MCNC: 2.2 MG/DL (ref 2.6–4.7)
PLATELET # BLD AUTO: 204 K/UL (ref 150–400)
PMV BLD AUTO: 9.7 FL (ref 8.9–12.9)
POTASSIUM SERPL-SCNC: 3.9 MMOL/L (ref 3.5–5.1)
PROT SERPL-MCNC: 6.3 G/DL (ref 6.4–8.2)
PROTHROMBIN TIME: 12.6 SEC (ref 9–11.1)
RBC # BLD AUTO: 3.21 M/UL (ref 4.1–5.7)
RBC MORPH BLD: ABNORMAL
SODIUM SERPL-SCNC: 140 MMOL/L (ref 136–145)
TIBC SERPL-MCNC: 263 UG/DL (ref 250–450)
UFH PPP CHRO-ACNC: 0.5 IU/ML
UFH PPP CHRO-ACNC: 0.72 IU/ML
UFH PPP CHRO-ACNC: 0.84 IU/ML
UFH PPP CHRO-ACNC: 0.93 IU/ML
VIT B12 SERPL-MCNC: 833 PG/ML (ref 193–986)
WBC # BLD AUTO: 8.7 K/UL (ref 4.1–11.1)

## 2024-11-26 PROCEDURE — 6370000000 HC RX 637 (ALT 250 FOR IP)

## 2024-11-26 PROCEDURE — 85610 PROTHROMBIN TIME: CPT

## 2024-11-26 PROCEDURE — 82728 ASSAY OF FERRITIN: CPT

## 2024-11-26 PROCEDURE — 6360000002 HC RX W HCPCS: Performed by: INTERNAL MEDICINE

## 2024-11-26 PROCEDURE — 71045 X-RAY EXAM CHEST 1 VIEW: CPT

## 2024-11-26 PROCEDURE — 99223 1ST HOSP IP/OBS HIGH 75: CPT | Performed by: INTERNAL MEDICINE

## 2024-11-26 PROCEDURE — 6370000000 HC RX 637 (ALT 250 FOR IP): Performed by: HOSPITALIST

## 2024-11-26 PROCEDURE — 92610 EVALUATE SWALLOWING FUNCTION: CPT

## 2024-11-26 PROCEDURE — 82746 ASSAY OF FOLIC ACID SERUM: CPT

## 2024-11-26 PROCEDURE — 97110 THERAPEUTIC EXERCISES: CPT

## 2024-11-26 PROCEDURE — 6360000002 HC RX W HCPCS: Performed by: NURSE PRACTITIONER

## 2024-11-26 PROCEDURE — 2580000003 HC RX 258: Performed by: HOSPITALIST

## 2024-11-26 PROCEDURE — 82607 VITAMIN B-12: CPT

## 2024-11-26 PROCEDURE — 6370000000 HC RX 637 (ALT 250 FOR IP): Performed by: INTERNAL MEDICINE

## 2024-11-26 PROCEDURE — 94761 N-INVAS EAR/PLS OXIMETRY MLT: CPT

## 2024-11-26 PROCEDURE — 85520 HEPARIN ASSAY: CPT

## 2024-11-26 PROCEDURE — 97530 THERAPEUTIC ACTIVITIES: CPT

## 2024-11-26 PROCEDURE — 2580000003 HC RX 258: Performed by: NURSE PRACTITIONER

## 2024-11-26 PROCEDURE — 94640 AIRWAY INHALATION TREATMENT: CPT

## 2024-11-26 PROCEDURE — 2700000000 HC OXYGEN THERAPY PER DAY

## 2024-11-26 PROCEDURE — 83540 ASSAY OF IRON: CPT

## 2024-11-26 PROCEDURE — 2060000000 HC ICU INTERMEDIATE R&B

## 2024-11-26 PROCEDURE — 80076 HEPATIC FUNCTION PANEL: CPT

## 2024-11-26 PROCEDURE — 83550 IRON BINDING TEST: CPT

## 2024-11-26 PROCEDURE — 2580000003 HC RX 258: Performed by: INTERNAL MEDICINE

## 2024-11-26 PROCEDURE — 83735 ASSAY OF MAGNESIUM: CPT

## 2024-11-26 RX ADMIN — MORPHINE SULFATE 2 MG: 4 INJECTION, SOLUTION INTRAMUSCULAR; INTRAVENOUS at 20:52

## 2024-11-26 RX ADMIN — SODIUM CHLORIDE, PRESERVATIVE FREE 10 ML: 5 INJECTION INTRAVENOUS at 22:37

## 2024-11-26 RX ADMIN — BUDESONIDE INHALATION 500 MCG: 0.5 SUSPENSION RESPIRATORY (INHALATION) at 08:09

## 2024-11-26 RX ADMIN — DOXYCYCLINE HYCLATE 100 MG: 100 TABLET, COATED ORAL at 22:25

## 2024-11-26 RX ADMIN — GUAIFENESIN 600 MG: 600 TABLET ORAL at 22:26

## 2024-11-26 RX ADMIN — LEVALBUTEROL HYDROCHLORIDE 1.25 MG: 1.25 SOLUTION RESPIRATORY (INHALATION) at 14:15

## 2024-11-26 RX ADMIN — WATER 20 MG: 1 INJECTION INTRAMUSCULAR; INTRAVENOUS; SUBCUTANEOUS at 12:00

## 2024-11-26 RX ADMIN — OXYCODONE 10 MG: 5 TABLET ORAL at 22:25

## 2024-11-26 RX ADMIN — SODIUM CHLORIDE, PRESERVATIVE FREE 10 ML: 5 INJECTION INTRAVENOUS at 09:27

## 2024-11-26 RX ADMIN — LEVALBUTEROL HYDROCHLORIDE 1.25 MG: 1.25 SOLUTION RESPIRATORY (INHALATION) at 08:09

## 2024-11-26 RX ADMIN — IPRATROPIUM BROMIDE 0.5 MG: 0.5 SOLUTION RESPIRATORY (INHALATION) at 20:19

## 2024-11-26 RX ADMIN — ARFORMOTEROL TARTRATE 15 MCG: 15 SOLUTION RESPIRATORY (INHALATION) at 08:09

## 2024-11-26 RX ADMIN — ARFORMOTEROL TARTRATE 15 MCG: 15 SOLUTION RESPIRATORY (INHALATION) at 20:19

## 2024-11-26 RX ADMIN — QUETIAPINE FUMARATE 50 MG: 100 TABLET ORAL at 22:26

## 2024-11-26 RX ADMIN — BUDESONIDE INHALATION 500 MCG: 0.5 SUSPENSION RESPIRATORY (INHALATION) at 20:19

## 2024-11-26 RX ADMIN — PIPERACILLIN AND TAZOBACTAM 3375 MG: 3; .375 INJECTION, POWDER, LYOPHILIZED, FOR SOLUTION INTRAVENOUS at 02:05

## 2024-11-26 RX ADMIN — IPRATROPIUM BROMIDE 0.5 MG: 0.5 SOLUTION RESPIRATORY (INHALATION) at 08:09

## 2024-11-26 RX ADMIN — PANTOPRAZOLE SODIUM 40 MG: 40 TABLET, DELAYED RELEASE ORAL at 06:16

## 2024-11-26 RX ADMIN — Medication 9 MG: at 22:26

## 2024-11-26 RX ADMIN — PIPERACILLIN AND TAZOBACTAM 3375 MG: 3; .375 INJECTION, POWDER, LYOPHILIZED, FOR SOLUTION INTRAVENOUS at 17:44

## 2024-11-26 RX ADMIN — WATER 20 MG: 1 INJECTION INTRAMUSCULAR; INTRAVENOUS; SUBCUTANEOUS at 22:26

## 2024-11-26 RX ADMIN — HEPARIN SODIUM 13 UNITS/KG/HR: 10000 INJECTION, SOLUTION INTRAVENOUS at 20:37

## 2024-11-26 RX ADMIN — LEVALBUTEROL HYDROCHLORIDE 1.25 MG: 1.25 SOLUTION RESPIRATORY (INHALATION) at 20:19

## 2024-11-26 RX ADMIN — PIPERACILLIN AND TAZOBACTAM 3375 MG: 3; .375 INJECTION, POWDER, LYOPHILIZED, FOR SOLUTION INTRAVENOUS at 09:27

## 2024-11-26 RX ADMIN — PRAVASTATIN SODIUM 40 MG: 20 TABLET ORAL at 22:26

## 2024-11-26 RX ADMIN — IPRATROPIUM BROMIDE 0.5 MG: 0.5 SOLUTION RESPIRATORY (INHALATION) at 14:15

## 2024-11-26 ASSESSMENT — PAIN DESCRIPTION - LOCATION
LOCATION: BACK
LOCATION: BACK

## 2024-11-26 ASSESSMENT — PAIN SCALES - GENERAL
PAINLEVEL_OUTOF10: 0
PAINLEVEL_OUTOF10: 7
PAINLEVEL_OUTOF10: 5
PAINLEVEL_OUTOF10: 7
PAINLEVEL_OUTOF10: 2

## 2024-11-26 ASSESSMENT — PAIN DESCRIPTION - ORIENTATION: ORIENTATION: RIGHT

## 2024-11-26 ASSESSMENT — PAIN DESCRIPTION - DESCRIPTORS: DESCRIPTORS: DISCOMFORT

## 2024-11-27 ENCOUNTER — APPOINTMENT (OUTPATIENT)
Facility: HOSPITAL | Age: 79
DRG: 180 | End: 2024-11-27
Payer: MEDICARE

## 2024-11-27 ENCOUNTER — TELEPHONE (OUTPATIENT)
Age: 79
End: 2024-11-27

## 2024-11-27 DIAGNOSIS — C34.90 METASTATIC NON-SMALL CELL LUNG CANCER (HCC): Primary | ICD-10-CM

## 2024-11-27 LAB
ALBUMIN SERPL-MCNC: 2.2 G/DL (ref 3.5–5)
ALBUMIN SERPL-MCNC: 2.2 G/DL (ref 3.5–5)
ALBUMIN/GLOB SERPL: 0.6 (ref 1.1–2.2)
ALP SERPL-CCNC: 68 U/L (ref 45–117)
ALT SERPL-CCNC: 81 U/L (ref 12–78)
ANION GAP SERPL CALC-SCNC: 3 MMOL/L (ref 2–12)
AST SERPL-CCNC: 38 U/L (ref 15–37)
BASOPHILS # BLD: 0 K/UL (ref 0–0.1)
BASOPHILS NFR BLD: 0 % (ref 0–1)
BILIRUB DIRECT SERPL-MCNC: 0.1 MG/DL (ref 0–0.2)
BILIRUB SERPL-MCNC: 0.5 MG/DL (ref 0.2–1)
BUN SERPL-MCNC: 18 MG/DL (ref 6–20)
BUN/CREAT SERPL: 20 (ref 12–20)
CALCIUM SERPL-MCNC: 10.2 MG/DL (ref 8.5–10.1)
CHLORIDE SERPL-SCNC: 103 MMOL/L (ref 97–108)
CO2 SERPL-SCNC: 32 MMOL/L (ref 21–32)
CREAT SERPL-MCNC: 0.9 MG/DL (ref 0.7–1.3)
DIFFERENTIAL METHOD BLD: ABNORMAL
EOSINOPHIL # BLD: 0 K/UL (ref 0–0.4)
EOSINOPHIL NFR BLD: 0 % (ref 0–7)
ERYTHROCYTE [DISTWIDTH] IN BLOOD BY AUTOMATED COUNT: 13.5 % (ref 11.5–14.5)
GLOBULIN SER CALC-MCNC: 3.4 G/DL (ref 2–4)
GLUCOSE SERPL-MCNC: 155 MG/DL (ref 65–100)
HCT VFR BLD AUTO: 32.6 % (ref 36.6–50.3)
HGB BLD-MCNC: 10.6 G/DL (ref 12.1–17)
IMM GRANULOCYTES # BLD AUTO: 0 K/UL (ref 0–0.04)
IMM GRANULOCYTES NFR BLD AUTO: 0 % (ref 0–0.5)
INR PPP: 1.2 (ref 0.9–1.1)
LYMPHOCYTES # BLD: 0.5 K/UL (ref 0.8–3.5)
LYMPHOCYTES NFR BLD: 6 % (ref 12–49)
MAGNESIUM SERPL-MCNC: 2.1 MG/DL (ref 1.6–2.4)
MCH RBC QN AUTO: 31.7 PG (ref 26–34)
MCHC RBC AUTO-ENTMCNC: 32.5 G/DL (ref 30–36.5)
MCV RBC AUTO: 97.6 FL (ref 80–99)
MONOCYTES # BLD: 0.3 K/UL (ref 0–1)
MONOCYTES NFR BLD: 4 % (ref 5–13)
NEUTS SEG # BLD: 7.2 K/UL (ref 1.8–8)
NEUTS SEG NFR BLD: 89 % (ref 32–75)
NRBC # BLD: 0 K/UL (ref 0–0.01)
NRBC BLD-RTO: 0 PER 100 WBC
PHOSPHATE SERPL-MCNC: 2.3 MG/DL (ref 2.6–4.7)
PLATELET # BLD AUTO: 214 K/UL (ref 150–400)
PMV BLD AUTO: 9.6 FL (ref 8.9–12.9)
POTASSIUM SERPL-SCNC: 4.4 MMOL/L (ref 3.5–5.1)
PROT SERPL-MCNC: 5.6 G/DL (ref 6.4–8.2)
PROTHROMBIN TIME: 12.4 SEC (ref 9–11.1)
RBC # BLD AUTO: 3.34 M/UL (ref 4.1–5.7)
SODIUM SERPL-SCNC: 138 MMOL/L (ref 136–145)
UFH PPP CHRO-ACNC: 0.65 IU/ML
WBC # BLD AUTO: 8 K/UL (ref 4.1–11.1)

## 2024-11-27 PROCEDURE — 6360000002 HC RX W HCPCS: Performed by: NURSE PRACTITIONER

## 2024-11-27 PROCEDURE — 94761 N-INVAS EAR/PLS OXIMETRY MLT: CPT

## 2024-11-27 PROCEDURE — 71045 X-RAY EXAM CHEST 1 VIEW: CPT

## 2024-11-27 PROCEDURE — 6370000000 HC RX 637 (ALT 250 FOR IP)

## 2024-11-27 PROCEDURE — 85520 HEPARIN ASSAY: CPT

## 2024-11-27 PROCEDURE — 6360000002 HC RX W HCPCS: Performed by: INTERNAL MEDICINE

## 2024-11-27 PROCEDURE — 97530 THERAPEUTIC ACTIVITIES: CPT

## 2024-11-27 PROCEDURE — 6370000000 HC RX 637 (ALT 250 FOR IP): Performed by: HOSPITALIST

## 2024-11-27 PROCEDURE — 85025 COMPLETE CBC W/AUTO DIFF WBC: CPT

## 2024-11-27 PROCEDURE — 85610 PROTHROMBIN TIME: CPT

## 2024-11-27 PROCEDURE — 2580000003 HC RX 258: Performed by: INTERNAL MEDICINE

## 2024-11-27 PROCEDURE — 80076 HEPATIC FUNCTION PANEL: CPT

## 2024-11-27 PROCEDURE — 99233 SBSQ HOSP IP/OBS HIGH 50: CPT | Performed by: INTERNAL MEDICINE

## 2024-11-27 PROCEDURE — 36415 COLL VENOUS BLD VENIPUNCTURE: CPT

## 2024-11-27 PROCEDURE — 83735 ASSAY OF MAGNESIUM: CPT

## 2024-11-27 PROCEDURE — 2500000003 HC RX 250 WO HCPCS: Performed by: INTERNAL MEDICINE

## 2024-11-27 PROCEDURE — 2500000003 HC RX 250 WO HCPCS: Performed by: NURSE PRACTITIONER

## 2024-11-27 PROCEDURE — 6370000000 HC RX 637 (ALT 250 FOR IP): Performed by: NURSE PRACTITIONER

## 2024-11-27 PROCEDURE — 92526 ORAL FUNCTION THERAPY: CPT

## 2024-11-27 PROCEDURE — 6370000000 HC RX 637 (ALT 250 FOR IP): Performed by: INTERNAL MEDICINE

## 2024-11-27 PROCEDURE — 94640 AIRWAY INHALATION TREATMENT: CPT

## 2024-11-27 PROCEDURE — 2580000003 HC RX 258: Performed by: HOSPITALIST

## 2024-11-27 PROCEDURE — 80069 RENAL FUNCTION PANEL: CPT

## 2024-11-27 PROCEDURE — 2700000000 HC OXYGEN THERAPY PER DAY

## 2024-11-27 PROCEDURE — 2060000000 HC ICU INTERMEDIATE R&B

## 2024-11-27 RX ORDER — MORPHINE SULFATE 2 MG/ML
2 INJECTION, SOLUTION INTRAMUSCULAR; INTRAVENOUS
Status: DISCONTINUED | OUTPATIENT
Start: 2024-11-27 | End: 2024-12-03 | Stop reason: HOSPADM

## 2024-11-27 RX ORDER — LORAZEPAM 2 MG/ML
0.5 INJECTION INTRAMUSCULAR
Status: DISCONTINUED | OUTPATIENT
Start: 2024-11-27 | End: 2024-12-03 | Stop reason: HOSPADM

## 2024-11-27 RX ORDER — GUAIFENESIN 200 MG/10ML
200 LIQUID ORAL EVERY 4 HOURS PRN
Status: DISCONTINUED | OUTPATIENT
Start: 2024-11-27 | End: 2024-12-03 | Stop reason: HOSPADM

## 2024-11-27 RX ORDER — PREDNISONE 20 MG/1
40 TABLET ORAL DAILY
Status: DISCONTINUED | OUTPATIENT
Start: 2024-11-27 | End: 2024-11-29

## 2024-11-27 RX ADMIN — LEVALBUTEROL HYDROCHLORIDE 1.25 MG: 1.25 SOLUTION RESPIRATORY (INHALATION) at 13:45

## 2024-11-27 RX ADMIN — IPRATROPIUM BROMIDE 0.5 MG: 0.5 SOLUTION RESPIRATORY (INHALATION) at 13:45

## 2024-11-27 RX ADMIN — ARFORMOTEROL TARTRATE 15 MCG: 15 SOLUTION RESPIRATORY (INHALATION) at 19:48

## 2024-11-27 RX ADMIN — PRAVASTATIN SODIUM 40 MG: 20 TABLET ORAL at 22:25

## 2024-11-27 RX ADMIN — PREDNISONE 40 MG: 20 TABLET ORAL at 09:44

## 2024-11-27 RX ADMIN — MORPHINE SULFATE 2 MG: 2 INJECTION, SOLUTION INTRAMUSCULAR; INTRAVENOUS at 20:16

## 2024-11-27 RX ADMIN — BUDESONIDE INHALATION 500 MCG: 0.5 SUSPENSION RESPIRATORY (INHALATION) at 07:34

## 2024-11-27 RX ADMIN — PANTOPRAZOLE SODIUM 40 MG: 40 TABLET, DELAYED RELEASE ORAL at 06:04

## 2024-11-27 RX ADMIN — OXYCODONE 10 MG: 5 TABLET ORAL at 22:25

## 2024-11-27 RX ADMIN — Medication 9 MG: at 22:25

## 2024-11-27 RX ADMIN — IPRATROPIUM BROMIDE 0.5 MG: 0.5 SOLUTION RESPIRATORY (INHALATION) at 07:34

## 2024-11-27 RX ADMIN — PIPERACILLIN AND TAZOBACTAM 3375 MG: 3; .375 INJECTION, POWDER, LYOPHILIZED, FOR SOLUTION INTRAVENOUS at 17:12

## 2024-11-27 RX ADMIN — PIPERACILLIN AND TAZOBACTAM 3375 MG: 3; .375 INJECTION, POWDER, LYOPHILIZED, FOR SOLUTION INTRAVENOUS at 09:40

## 2024-11-27 RX ADMIN — ARFORMOTEROL TARTRATE 15 MCG: 15 SOLUTION RESPIRATORY (INHALATION) at 07:34

## 2024-11-27 RX ADMIN — FUROSEMIDE 20 MG: 20 TABLET ORAL at 08:29

## 2024-11-27 RX ADMIN — MORPHINE SULFATE 2 MG: 2 INJECTION, SOLUTION INTRAMUSCULAR; INTRAVENOUS at 19:03

## 2024-11-27 RX ADMIN — BUDESONIDE INHALATION 500 MCG: 0.5 SUSPENSION RESPIRATORY (INHALATION) at 19:48

## 2024-11-27 RX ADMIN — IPRATROPIUM BROMIDE 0.5 MG: 0.5 SOLUTION RESPIRATORY (INHALATION) at 19:48

## 2024-11-27 RX ADMIN — MORPHINE SULFATE 2 MG: 2 INJECTION, SOLUTION INTRAMUSCULAR; INTRAVENOUS at 14:00

## 2024-11-27 RX ADMIN — LEVALBUTEROL HYDROCHLORIDE 1.25 MG: 1.25 SOLUTION RESPIRATORY (INHALATION) at 07:34

## 2024-11-27 RX ADMIN — LORAZEPAM 0.5 MG: 2 INJECTION INTRAMUSCULAR; INTRAVENOUS at 14:00

## 2024-11-27 RX ADMIN — LEVALBUTEROL HYDROCHLORIDE 1.25 MG: 1.25 SOLUTION RESPIRATORY (INHALATION) at 19:48

## 2024-11-27 RX ADMIN — PIPERACILLIN AND TAZOBACTAM 3375 MG: 3; .375 INJECTION, POWDER, LYOPHILIZED, FOR SOLUTION INTRAVENOUS at 02:39

## 2024-11-27 RX ADMIN — SODIUM CHLORIDE, PRESERVATIVE FREE 10 ML: 5 INJECTION INTRAVENOUS at 08:26

## 2024-11-27 RX ADMIN — LORAZEPAM 0.5 MG: 0.5 TABLET ORAL at 19:03

## 2024-11-27 RX ADMIN — GUAIFENESIN 600 MG: 600 TABLET ORAL at 08:28

## 2024-11-27 RX ADMIN — SODIUM PHOSPHATE, MONOBASIC, MONOHYDRATE AND SODIUM PHOSPHATE, DIBASIC, ANHYDROUS 15 MMOL: 276; 142 INJECTION, SOLUTION INTRAVENOUS at 09:39

## 2024-11-27 RX ADMIN — DOXYCYCLINE HYCLATE 100 MG: 100 TABLET, COATED ORAL at 08:29

## 2024-11-27 RX ADMIN — QUETIAPINE FUMARATE 50 MG: 100 TABLET ORAL at 22:26

## 2024-11-27 RX ADMIN — GUAIFENESIN 200 MG: 200 SOLUTION ORAL at 20:24

## 2024-11-27 RX ADMIN — SODIUM CHLORIDE, PRESERVATIVE FREE 10 ML: 5 INJECTION INTRAVENOUS at 22:26

## 2024-11-27 ASSESSMENT — PAIN DESCRIPTION - LOCATION
LOCATION: BACK

## 2024-11-27 ASSESSMENT — PAIN SCALES - GENERAL
PAINLEVEL_OUTOF10: 5
PAINLEVEL_OUTOF10: 7
PAINLEVEL_OUTOF10: 5

## 2024-11-27 ASSESSMENT — PAIN DESCRIPTION - DESCRIPTORS: DESCRIPTORS: DISCOMFORT

## 2024-11-27 ASSESSMENT — PAIN DESCRIPTION - ORIENTATION: ORIENTATION: RIGHT

## 2024-11-27 NOTE — TELEPHONE ENCOUNTER
[facility]  [phone]    11/27/24- Per verbal order per  [facility] CDX and PDL1 ordered on specimen ID# [id number]. New order placed in EMR.    12/03/24- Per  online portal- waiting on specimen. Phone call placed to  path lab spoke to Andreas she confirmed new request received but not sent out yet.     3:33 PM- Per -He is going hospice- need to cancel testing. Phone call placed to Andreas she will not send out specimen. Also sent an email to Malou with  cancelling test.

## 2024-11-28 ENCOUNTER — APPOINTMENT (OUTPATIENT)
Facility: HOSPITAL | Age: 79
DRG: 180 | End: 2024-11-28
Payer: MEDICARE

## 2024-11-28 LAB
ALBUMIN SERPL-MCNC: 2.1 G/DL (ref 3.5–5)
ALBUMIN SERPL-MCNC: 2.3 G/DL (ref 3.5–5)
ALBUMIN/GLOB SERPL: 0.8 (ref 1.1–2.2)
ALP SERPL-CCNC: 75 U/L (ref 45–117)
ALT SERPL-CCNC: 61 U/L (ref 12–78)
ANION GAP SERPL CALC-SCNC: 2 MMOL/L (ref 2–12)
AST SERPL-CCNC: 23 U/L (ref 15–37)
BASOPHILS # BLD: 0 K/UL (ref 0–0.1)
BASOPHILS NFR BLD: 0 % (ref 0–1)
BILIRUB DIRECT SERPL-MCNC: 0.1 MG/DL (ref 0–0.2)
BILIRUB SERPL-MCNC: 0.7 MG/DL (ref 0.2–1)
BUN SERPL-MCNC: 17 MG/DL (ref 6–20)
BUN/CREAT SERPL: 20 (ref 12–20)
CALCIUM SERPL-MCNC: 9.5 MG/DL (ref 8.5–10.1)
CHLORIDE SERPL-SCNC: 102 MMOL/L (ref 97–108)
CO2 SERPL-SCNC: 32 MMOL/L (ref 21–32)
CREAT SERPL-MCNC: 0.86 MG/DL (ref 0.7–1.3)
DIFFERENTIAL METHOD BLD: ABNORMAL
EOSINOPHIL # BLD: 0 K/UL (ref 0–0.4)
EOSINOPHIL NFR BLD: 0 % (ref 0–7)
ERYTHROCYTE [DISTWIDTH] IN BLOOD BY AUTOMATED COUNT: 13.3 % (ref 11.5–14.5)
GLOBULIN SER CALC-MCNC: 3 G/DL (ref 2–4)
GLUCOSE SERPL-MCNC: 150 MG/DL (ref 65–100)
HCT VFR BLD AUTO: 32.7 % (ref 36.6–50.3)
HGB BLD-MCNC: 10.6 G/DL (ref 12.1–17)
IMM GRANULOCYTES # BLD AUTO: 0.1 K/UL (ref 0–0.04)
IMM GRANULOCYTES NFR BLD AUTO: 1 % (ref 0–0.5)
INR PPP: 1.2 (ref 0.9–1.1)
LYMPHOCYTES # BLD: 0.9 K/UL (ref 0.8–3.5)
LYMPHOCYTES NFR BLD: 10 % (ref 12–49)
MAGNESIUM SERPL-MCNC: 1.9 MG/DL (ref 1.6–2.4)
MCH RBC QN AUTO: 31.1 PG (ref 26–34)
MCHC RBC AUTO-ENTMCNC: 32.4 G/DL (ref 30–36.5)
MCV RBC AUTO: 95.9 FL (ref 80–99)
MONOCYTES # BLD: 0.7 K/UL (ref 0–1)
MONOCYTES NFR BLD: 8 % (ref 5–13)
NEUTS SEG # BLD: 7.4 K/UL (ref 1.8–8)
NEUTS SEG NFR BLD: 81 % (ref 32–75)
NRBC # BLD: 0 K/UL (ref 0–0.01)
NRBC BLD-RTO: 0 PER 100 WBC
PHOSPHATE SERPL-MCNC: 2.4 MG/DL (ref 2.6–4.7)
PLATELET # BLD AUTO: 209 K/UL (ref 150–400)
PMV BLD AUTO: 9.6 FL (ref 8.9–12.9)
POTASSIUM SERPL-SCNC: 3.8 MMOL/L (ref 3.5–5.1)
PROT SERPL-MCNC: 5.3 G/DL (ref 6.4–8.2)
PROTHROMBIN TIME: 11.9 SEC (ref 9–11.1)
RBC # BLD AUTO: 3.41 M/UL (ref 4.1–5.7)
SODIUM SERPL-SCNC: 136 MMOL/L (ref 136–145)
UFH PPP CHRO-ACNC: 0.43 IU/ML
UFH PPP CHRO-ACNC: 0.45 IU/ML
WBC # BLD AUTO: 9.1 K/UL (ref 4.1–11.1)

## 2024-11-28 PROCEDURE — 6360000002 HC RX W HCPCS: Performed by: INTERNAL MEDICINE

## 2024-11-28 PROCEDURE — 2580000003 HC RX 258: Performed by: INTERNAL MEDICINE

## 2024-11-28 PROCEDURE — 85025 COMPLETE CBC W/AUTO DIFF WBC: CPT

## 2024-11-28 PROCEDURE — 80076 HEPATIC FUNCTION PANEL: CPT

## 2024-11-28 PROCEDURE — 94761 N-INVAS EAR/PLS OXIMETRY MLT: CPT

## 2024-11-28 PROCEDURE — 83735 ASSAY OF MAGNESIUM: CPT

## 2024-11-28 PROCEDURE — 6370000000 HC RX 637 (ALT 250 FOR IP): Performed by: NURSE PRACTITIONER

## 2024-11-28 PROCEDURE — 36415 COLL VENOUS BLD VENIPUNCTURE: CPT

## 2024-11-28 PROCEDURE — 6370000000 HC RX 637 (ALT 250 FOR IP): Performed by: HOSPITALIST

## 2024-11-28 PROCEDURE — 2060000000 HC ICU INTERMEDIATE R&B

## 2024-11-28 PROCEDURE — 94640 AIRWAY INHALATION TREATMENT: CPT

## 2024-11-28 PROCEDURE — 85610 PROTHROMBIN TIME: CPT

## 2024-11-28 PROCEDURE — 85520 HEPARIN ASSAY: CPT

## 2024-11-28 PROCEDURE — 80069 RENAL FUNCTION PANEL: CPT

## 2024-11-28 PROCEDURE — 6360000002 HC RX W HCPCS: Performed by: NURSE PRACTITIONER

## 2024-11-28 PROCEDURE — 2580000003 HC RX 258: Performed by: HOSPITALIST

## 2024-11-28 PROCEDURE — 71045 X-RAY EXAM CHEST 1 VIEW: CPT

## 2024-11-28 PROCEDURE — 6370000000 HC RX 637 (ALT 250 FOR IP)

## 2024-11-28 PROCEDURE — 6370000000 HC RX 637 (ALT 250 FOR IP): Performed by: INTERNAL MEDICINE

## 2024-11-28 PROCEDURE — 2500000003 HC RX 250 WO HCPCS: Performed by: NURSE PRACTITIONER

## 2024-11-28 PROCEDURE — 2700000000 HC OXYGEN THERAPY PER DAY

## 2024-11-28 RX ADMIN — ARFORMOTEROL TARTRATE 15 MCG: 15 SOLUTION RESPIRATORY (INHALATION) at 18:08

## 2024-11-28 RX ADMIN — PIPERACILLIN AND TAZOBACTAM 3375 MG: 3; .375 INJECTION, POWDER, LYOPHILIZED, FOR SOLUTION INTRAVENOUS at 02:44

## 2024-11-28 RX ADMIN — GUAIFENESIN 200 MG: 200 SOLUTION ORAL at 13:13

## 2024-11-28 RX ADMIN — PIPERACILLIN AND TAZOBACTAM 3375 MG: 3; .375 INJECTION, POWDER, LYOPHILIZED, FOR SOLUTION INTRAVENOUS at 09:55

## 2024-11-28 RX ADMIN — IPRATROPIUM BROMIDE 0.5 MG: 0.5 SOLUTION RESPIRATORY (INHALATION) at 13:54

## 2024-11-28 RX ADMIN — PANTOPRAZOLE SODIUM 40 MG: 40 TABLET, DELAYED RELEASE ORAL at 06:11

## 2024-11-28 RX ADMIN — SODIUM CHLORIDE, PRESERVATIVE FREE 10 ML: 5 INJECTION INTRAVENOUS at 22:28

## 2024-11-28 RX ADMIN — PREDNISONE 40 MG: 20 TABLET ORAL at 09:18

## 2024-11-28 RX ADMIN — LEVALBUTEROL HYDROCHLORIDE 1.25 MG: 1.25 SOLUTION RESPIRATORY (INHALATION) at 13:54

## 2024-11-28 RX ADMIN — BUDESONIDE INHALATION 500 MCG: 0.5 SUSPENSION RESPIRATORY (INHALATION) at 18:08

## 2024-11-28 RX ADMIN — BUDESONIDE INHALATION 500 MCG: 0.5 SUSPENSION RESPIRATORY (INHALATION) at 08:02

## 2024-11-28 RX ADMIN — IPRATROPIUM BROMIDE 0.5 MG: 0.5 SOLUTION RESPIRATORY (INHALATION) at 07:56

## 2024-11-28 RX ADMIN — LEVALBUTEROL HYDROCHLORIDE 1.25 MG: 1.25 SOLUTION RESPIRATORY (INHALATION) at 18:02

## 2024-11-28 RX ADMIN — PIPERACILLIN AND TAZOBACTAM 3375 MG: 3; .375 INJECTION, POWDER, LYOPHILIZED, FOR SOLUTION INTRAVENOUS at 17:28

## 2024-11-28 RX ADMIN — SODIUM CHLORIDE, PRESERVATIVE FREE 10 ML: 5 INJECTION INTRAVENOUS at 09:19

## 2024-11-28 RX ADMIN — OXYCODONE 10 MG: 5 TABLET ORAL at 12:30

## 2024-11-28 RX ADMIN — MORPHINE SULFATE 2 MG: 2 INJECTION, SOLUTION INTRAMUSCULAR; INTRAVENOUS at 20:17

## 2024-11-28 RX ADMIN — LEVALBUTEROL HYDROCHLORIDE 1.25 MG: 1.25 SOLUTION RESPIRATORY (INHALATION) at 07:56

## 2024-11-28 RX ADMIN — ARFORMOTEROL TARTRATE 15 MCG: 15 SOLUTION RESPIRATORY (INHALATION) at 08:02

## 2024-11-28 RX ADMIN — FUROSEMIDE 20 MG: 20 TABLET ORAL at 09:18

## 2024-11-28 RX ADMIN — PRAVASTATIN SODIUM 40 MG: 20 TABLET ORAL at 22:28

## 2024-11-28 RX ADMIN — HEPARIN SODIUM 13 UNITS/KG/HR: 10000 INJECTION, SOLUTION INTRAVENOUS at 09:57

## 2024-11-28 RX ADMIN — QUETIAPINE FUMARATE 50 MG: 100 TABLET ORAL at 22:27

## 2024-11-28 RX ADMIN — Medication 9 MG: at 22:27

## 2024-11-28 RX ADMIN — OXYCODONE 10 MG: 5 TABLET ORAL at 22:27

## 2024-11-28 RX ADMIN — IPRATROPIUM BROMIDE 0.5 MG: 0.5 SOLUTION RESPIRATORY (INHALATION) at 18:02

## 2024-11-28 ASSESSMENT — PAIN DESCRIPTION - DESCRIPTORS
DESCRIPTORS: ACHING
DESCRIPTORS: NAGGING

## 2024-11-28 ASSESSMENT — PAIN DESCRIPTION - LOCATION
LOCATION: FLANK
LOCATION: ABDOMEN

## 2024-11-28 ASSESSMENT — PAIN DESCRIPTION - ORIENTATION
ORIENTATION: RIGHT
ORIENTATION: RIGHT

## 2024-11-28 ASSESSMENT — PAIN SCALES - GENERAL
PAINLEVEL_OUTOF10: 4
PAINLEVEL_OUTOF10: 4
PAINLEVEL_OUTOF10: 7
PAINLEVEL_OUTOF10: 4

## 2024-11-29 LAB
ERYTHROCYTE [DISTWIDTH] IN BLOOD BY AUTOMATED COUNT: 13.4 % (ref 11.5–14.5)
HCT VFR BLD AUTO: 33.2 % (ref 36.6–50.3)
HGB BLD-MCNC: 11.2 G/DL (ref 12.1–17)
MCH RBC QN AUTO: 32.5 PG (ref 26–34)
MCHC RBC AUTO-ENTMCNC: 33.7 G/DL (ref 30–36.5)
MCV RBC AUTO: 96.2 FL (ref 80–99)
NRBC # BLD: 0 K/UL (ref 0–0.01)
NRBC BLD-RTO: 0 PER 100 WBC
PLATELET # BLD AUTO: 242 K/UL (ref 150–400)
PMV BLD AUTO: 9.6 FL (ref 8.9–12.9)
RBC # BLD AUTO: 3.45 M/UL (ref 4.1–5.7)
UFH PPP CHRO-ACNC: 0.38 IU/ML
WBC # BLD AUTO: 9.9 K/UL (ref 4.1–11.1)

## 2024-11-29 PROCEDURE — 2060000000 HC ICU INTERMEDIATE R&B

## 2024-11-29 PROCEDURE — 6370000000 HC RX 637 (ALT 250 FOR IP): Performed by: INTERNAL MEDICINE

## 2024-11-29 PROCEDURE — 2580000003 HC RX 258: Performed by: INTERNAL MEDICINE

## 2024-11-29 PROCEDURE — 6360000002 HC RX W HCPCS: Performed by: NURSE PRACTITIONER

## 2024-11-29 PROCEDURE — 6370000000 HC RX 637 (ALT 250 FOR IP)

## 2024-11-29 PROCEDURE — 2700000000 HC OXYGEN THERAPY PER DAY

## 2024-11-29 PROCEDURE — 94761 N-INVAS EAR/PLS OXIMETRY MLT: CPT

## 2024-11-29 PROCEDURE — 6360000002 HC RX W HCPCS: Performed by: INTERNAL MEDICINE

## 2024-11-29 PROCEDURE — 6370000000 HC RX 637 (ALT 250 FOR IP): Performed by: STUDENT IN AN ORGANIZED HEALTH CARE EDUCATION/TRAINING PROGRAM

## 2024-11-29 PROCEDURE — 85027 COMPLETE CBC AUTOMATED: CPT

## 2024-11-29 PROCEDURE — 2580000003 HC RX 258: Performed by: HOSPITALIST

## 2024-11-29 PROCEDURE — 6370000000 HC RX 637 (ALT 250 FOR IP): Performed by: HOSPITALIST

## 2024-11-29 PROCEDURE — 85520 HEPARIN ASSAY: CPT

## 2024-11-29 PROCEDURE — 94640 AIRWAY INHALATION TREATMENT: CPT

## 2024-11-29 PROCEDURE — 99233 SBSQ HOSP IP/OBS HIGH 50: CPT | Performed by: STUDENT IN AN ORGANIZED HEALTH CARE EDUCATION/TRAINING PROGRAM

## 2024-11-29 RX ORDER — QUETIAPINE FUMARATE 25 MG/1
25 TABLET, FILM COATED ORAL NIGHTLY
Status: COMPLETED | OUTPATIENT
Start: 2024-11-29 | End: 2024-12-01

## 2024-11-29 RX ADMIN — IPRATROPIUM BROMIDE 0.5 MG: 0.5 SOLUTION RESPIRATORY (INHALATION) at 18:09

## 2024-11-29 RX ADMIN — MORPHINE SULFATE 2 MG: 2 INJECTION, SOLUTION INTRAMUSCULAR; INTRAVENOUS at 17:53

## 2024-11-29 RX ADMIN — Medication 9 MG: at 22:09

## 2024-11-29 RX ADMIN — ARFORMOTEROL TARTRATE 15 MCG: 15 SOLUTION RESPIRATORY (INHALATION) at 18:15

## 2024-11-29 RX ADMIN — PIPERACILLIN AND TAZOBACTAM 3375 MG: 3; .375 INJECTION, POWDER, LYOPHILIZED, FOR SOLUTION INTRAVENOUS at 10:44

## 2024-11-29 RX ADMIN — BUDESONIDE INHALATION 500 MCG: 0.5 SUSPENSION RESPIRATORY (INHALATION) at 18:15

## 2024-11-29 RX ADMIN — OXYCODONE 10 MG: 5 TABLET ORAL at 09:43

## 2024-11-29 RX ADMIN — ARFORMOTEROL TARTRATE 15 MCG: 15 SOLUTION RESPIRATORY (INHALATION) at 07:58

## 2024-11-29 RX ADMIN — SODIUM CHLORIDE, PRESERVATIVE FREE 10 ML: 5 INJECTION INTRAVENOUS at 20:03

## 2024-11-29 RX ADMIN — LEVALBUTEROL HYDROCHLORIDE 1.25 MG: 1.25 SOLUTION RESPIRATORY (INHALATION) at 14:37

## 2024-11-29 RX ADMIN — QUETIAPINE FUMARATE 25 MG: 25 TABLET ORAL at 22:10

## 2024-11-29 RX ADMIN — BUDESONIDE INHALATION 500 MCG: 0.5 SUSPENSION RESPIRATORY (INHALATION) at 07:58

## 2024-11-29 RX ADMIN — LEVALBUTEROL HYDROCHLORIDE 1.25 MG: 1.25 SOLUTION RESPIRATORY (INHALATION) at 07:52

## 2024-11-29 RX ADMIN — PREDNISONE 30 MG: 10 TABLET ORAL at 09:20

## 2024-11-29 RX ADMIN — PRAVASTATIN SODIUM 40 MG: 20 TABLET ORAL at 20:03

## 2024-11-29 RX ADMIN — PIPERACILLIN AND TAZOBACTAM 3375 MG: 3; .375 INJECTION, POWDER, LYOPHILIZED, FOR SOLUTION INTRAVENOUS at 17:55

## 2024-11-29 RX ADMIN — IPRATROPIUM BROMIDE 0.5 MG: 0.5 SOLUTION RESPIRATORY (INHALATION) at 14:37

## 2024-11-29 RX ADMIN — OXYCODONE 5 MG: 5 TABLET ORAL at 22:08

## 2024-11-29 RX ADMIN — PANTOPRAZOLE SODIUM 40 MG: 40 TABLET, DELAYED RELEASE ORAL at 06:29

## 2024-11-29 RX ADMIN — FUROSEMIDE 20 MG: 20 TABLET ORAL at 09:20

## 2024-11-29 RX ADMIN — PIPERACILLIN AND TAZOBACTAM 3375 MG: 3; .375 INJECTION, POWDER, LYOPHILIZED, FOR SOLUTION INTRAVENOUS at 02:02

## 2024-11-29 RX ADMIN — IPRATROPIUM BROMIDE 0.5 MG: 0.5 SOLUTION RESPIRATORY (INHALATION) at 07:52

## 2024-11-29 RX ADMIN — LEVALBUTEROL HYDROCHLORIDE 1.25 MG: 1.25 SOLUTION RESPIRATORY (INHALATION) at 18:09

## 2024-11-29 ASSESSMENT — PAIN SCALES - GENERAL
PAINLEVEL_OUTOF10: 0
PAINLEVEL_OUTOF10: 4
PAINLEVEL_OUTOF10: 0

## 2024-11-29 ASSESSMENT — PAIN DESCRIPTION - ORIENTATION: ORIENTATION: RIGHT

## 2024-11-29 ASSESSMENT — PAIN DESCRIPTION - DESCRIPTORS: DESCRIPTORS: ACHING

## 2024-11-29 ASSESSMENT — PAIN DESCRIPTION - LOCATION: LOCATION: BACK

## 2024-11-29 NOTE — CARE COORDINATION
Care Management Progress Note    Reason for Admission:   Pleural effusion [J90]  Elevated troponin [R79.89]  Acute on chronic respiratory failure with hypoxia [J96.21]  Acute hypoxemic respiratory failure [J96.01]  Acute on chronic congestive heart failure, unspecified heart failure type (HCC) [I50.9]         Patient Admission Status: Inpatient  RUR:   Hospitalization in the last 30 days (Readmission):  No        Transition of care plan:  Per chart review patient with Palliative consult to discuss GOC. Patient remains with high O2 demand. Dispo pending improvement in respiratory status and decision on GOC. CM following.  ______________________  Staci NEWSOME, RN  Care Management  11/29/2024

## 2024-11-29 NOTE — ACP (ADVANCE CARE PLANNING)
Primary Decision Maker: Cale Dixon - [facility] - [phone]    Pt has AMD on file dated 3/4/2024 which appoints rojelio Dixon as sole Medical POA.

## 2024-11-29 NOTE — CONSULTS
[facility] [place] [facility]  [address] [address], [address] [facility] [address]  W: [phone]  F: [phone]      Reason for Visit:   Davis Dixon is a 79 y.o. male who is seen today for evaluation of LLL PE  c/f possibly malignancy     Hematology/Oncology Treatment History:   Hx of lung cancer ; follows with Dr Bereket Hughes : have requested records     History of Present Illness:   Davis Dixon is a pleasant 79 y.o. male who was admitted on 11/20/24  for SOB. Ed imaging reveals moderate rt pleural  effusion with collapse of rt base; severe emphysema. Pulmonary consulted . Pt underwent thoracentesis on 11/21/24 ; fluid re accumulated and pt had pigtail drain placed with suction 11/22  PMHX COPD, AAA, TIA  Sitting up in chair at bedside. Pain to chest tube insertion site. Breathing greatly improved since admission.   Denies prior hx of clots. Denies family hx of clots. Denies recent travel, surgeries or hormone replacement.   Never had chest fluid removed before. Denies know hx of anemia. Uses O2 at home ; Reports 30# weight loss over the last year.   Hx of lung cancer : surgery x 2  (80s and a \" few\" yrs ago) . Follows with Dr Hughes. Never had chemo or radiation.   Family hx of cancer : Father ( cancer/ uncertain of type)  sister ( breast cancer )   : lives by himself . Has 5 children  Granddaughter and great-grandson at bedside.       Review of systems was obtained and pertinent findings reviewed above. Past medical history, social history, family history, medications, and allergies are located in the electronic medical record.    Physical Exam:   Visit Vitals  /63   Pulse 70   Temp 97.5 °F (36.4 °C) (Oral)   Resp 20   Ht 1.702 m (5' 7\")   Wt 57 kg (125 lb 11.2 oz)   SpO2 93%   BMI 19.69 kg/m²     ECOG PS: 2-3  General:chronically ill appearing ; no acute distress  Eyes: anicteric sclerae  HENT: oropharynx clear  Neck: supple  Lymphatic: no cervical, 
Brief Vascular Surgery  Consult Note    Impression:    Asked to look at Chest CT 2/2 ?endoleak  EVAR 4-5 yrs ago at [facility] - no follow up  CT reviewed: not adequate to assess AAA s/p EVAR    Plan:    Dr Meléndez will follow up  Either needs CTA A/P w/ endograft protocol or follow up w/ duplex    Alonso Perez MD  
[facility]     Name: Davis Dixon MRN: [id number]   : 1945 Hospital: [facility]   Date: 2024        Impression Plan   Acute on chronic respiratory failure  Hypoxia  Pleural effusion  COPD  Wt loss  Leg edema  Hx of two primary lung cancers               Pleural effusion concerning for malignancy. May be CHF related since pt has new leg edema  Wean O2 to keep sats above 90%- pt on 2 liters at home  U/S guided thoracentesis ordered by Dr Venegas  Thoracentesis labs entered  Echo  Hold ASA  Hold AC proph  Abx for now. Will see what imaging looks like post thoracentesis         Radiology  ( personally reviewed) CT chest reviewed: Right sided moderate pleural effusion. Extensive emphysema.    ABG Invalid input(s): \"PHI\", \"PO2I\", \"PCO2I\"       Subjective     Cc: shortness of breath    78 yo with PMHx of chronic respiratory failure (2 liters), COPD, Lung cancer in 1980s and in one in . Both of them resected. Pt states he has been more short of breath for the last 2 weeks. Denies fevers/chills. Coughing up white sputum. Smoked until 1 month ago. Weight loss has been a problem for the last 10 years. He has had leg swelling for the last month. CT chest with right sided somewhat loculated pleural effusion. WBC wnl on admission. proBNP elevated. Currently on 7 liters.    Review of Systems:  Pertinent items are noted in HPI.    Past Medical History:   Diagnosis Date    AAA (abdominal aortic aneurysm) (HCC)     stenting 2019 by Dr. Collazo    COPD (chronic obstructive pulmonary disease) (HCC)     History of lung surgery     Lung mass     TIA (transient ischemic attack)       No past surgical history on file.   Prior to Admission medications    Medication Sig Start Date End Date Taking? Authorizing Provider   Aspirin 81 MG CAPS Take 81 mg by mouth daily    ProviderAndrew MD   Multiple Vitamins-Minerals (MULTIVITAMIN ADULT EXTRA C PO) Take 1 tablet by mouth daily    
Palliative Medicine  Patient Name: Davis Dixon  YOB: 1945  MRN: [id number]  Age: 79 y.o.  Gender: male    Date of Initial Consult: 11/25/2024  Date of Service:11/25/2024  Time: 3:33 PM  Provider: IJEOMA Rodriguez NP    Admit Date: 11/20/2024  Referring Provider: Dr. Schaffer      Reasons for Consultation:  Goals of Care    HISTORY OF PRESENT ILLNESS (HPI):   Davis Dixon is a 79 y.o. male with a past medical history of COPD on 2 L NC continuously, AAA s/p 2019, TIA, history of 2 primary lung cancers s/p lung resection both times ( 1st [id number] and 2nd in 2000s), +long term tobacco use up until 10/2024,  who presented from home with CC of worsening chronic SOB x 1 week, with intermittent coughing up gray-colored mucus and also reports weight loss 11/20/2024 with a diagnosis of acute on chronic hypoxic respiratory failure, right pleural effusion 2/2 CHF versus malignancy.     ER workup:  On exam patient O2 sat 68% on 2 L O2, O2 sats improved on NRB 5 L.  Abnormal labs-BNP> 7000.    CXR-right pleural effusion with associated consolidation, most likely atelectasis.  CT chest shows moderate right pleural effusion with collapse of lung base, severe emphysema.  Recommended follow-up to exclude underlying mass lesion.  Borderline enlarged precarinal node.    Course of hospitalization:  11/21-on 7 L nasal cannula  CXR with diminished small right pleural effusion, slightly improved right basilar atelectasis/airspace disease.    11/22-s/p right sided thoracentesis removing 1400 mL hazy ike fluid, chest tube placed..    11/23-  US duplex right LE-+ acute DVT in right soleal vein.  Acute DVT right peroneal vein.      11/24-CTA chest-1. + LLL PE.  2.  Moderate right pleural effusion with chest tube in position.  3.  Improved right pleural effusion.  4.  Small left pleural effusion is new.  5.  4.2 cm infrarenal AAA with endograft in position and possible endoleak    11/25-remains on 7 L supplemental 
billing time based rather than MDM  [x] The total encounter time on this service date was _55___ minutes which was spent performing a face-to-face encounter and personally completing the provider-level activities documented in the note. This includes time spent prior to the visit and after the visit in direct care of the patient. This time does not include time spent in any separately reportable services.    Electronically signed by   Remi Calvert MD  Palliative Care Team  on 11/29/2024 at 12:11 PM

## 2024-11-30 ENCOUNTER — APPOINTMENT (OUTPATIENT)
Facility: HOSPITAL | Age: 79
DRG: 180 | End: 2024-11-30
Payer: MEDICARE

## 2024-11-30 LAB
ALBUMIN SERPL-MCNC: 2 G/DL (ref 3.5–5)
ALBUMIN SERPL-MCNC: 2.2 G/DL (ref 3.5–5)
ALBUMIN/GLOB SERPL: 0.8 (ref 1.1–2.2)
ALP SERPL-CCNC: 68 U/L (ref 45–117)
ALT SERPL-CCNC: 47 U/L (ref 12–78)
ANION GAP SERPL CALC-SCNC: 0 MMOL/L (ref 2–12)
AST SERPL-CCNC: 24 U/L (ref 15–37)
BILIRUB DIRECT SERPL-MCNC: 0.1 MG/DL (ref 0–0.2)
BILIRUB SERPL-MCNC: 0.4 MG/DL (ref 0.2–1)
BUN SERPL-MCNC: 14 MG/DL (ref 6–20)
BUN/CREAT SERPL: 17 (ref 12–20)
CALCIUM SERPL-MCNC: 9.6 MG/DL (ref 8.5–10.1)
CHLORIDE SERPL-SCNC: 97 MMOL/L (ref 97–108)
CO2 SERPL-SCNC: 38 MMOL/L (ref 21–32)
CREAT SERPL-MCNC: 0.84 MG/DL (ref 0.7–1.3)
GLOBULIN SER CALC-MCNC: 2.8 G/DL (ref 2–4)
GLUCOSE SERPL-MCNC: 93 MG/DL (ref 65–100)
INR PPP: 1.1 (ref 0.9–1.1)
MAGNESIUM SERPL-MCNC: 2 MG/DL (ref 1.6–2.4)
PHOSPHATE SERPL-MCNC: 2.4 MG/DL (ref 2.6–4.7)
POTASSIUM SERPL-SCNC: 3.9 MMOL/L (ref 3.5–5.1)
PROT SERPL-MCNC: 5 G/DL (ref 6.4–8.2)
PROTHROMBIN TIME: 11.7 SEC (ref 9–11.1)
SODIUM SERPL-SCNC: 135 MMOL/L (ref 136–145)
UFH PPP CHRO-ACNC: 0.41 IU/ML

## 2024-11-30 PROCEDURE — 85520 HEPARIN ASSAY: CPT

## 2024-11-30 PROCEDURE — 2500000003 HC RX 250 WO HCPCS: Performed by: NURSE PRACTITIONER

## 2024-11-30 PROCEDURE — 6370000000 HC RX 637 (ALT 250 FOR IP): Performed by: INTERNAL MEDICINE

## 2024-11-30 PROCEDURE — 2700000000 HC OXYGEN THERAPY PER DAY

## 2024-11-30 PROCEDURE — 94640 AIRWAY INHALATION TREATMENT: CPT

## 2024-11-30 PROCEDURE — 71045 X-RAY EXAM CHEST 1 VIEW: CPT

## 2024-11-30 PROCEDURE — 6360000002 HC RX W HCPCS: Performed by: NURSE PRACTITIONER

## 2024-11-30 PROCEDURE — 80076 HEPATIC FUNCTION PANEL: CPT

## 2024-11-30 PROCEDURE — 6360000002 HC RX W HCPCS: Performed by: INTERNAL MEDICINE

## 2024-11-30 PROCEDURE — 2580000003 HC RX 258: Performed by: INTERNAL MEDICINE

## 2024-11-30 PROCEDURE — 2060000000 HC ICU INTERMEDIATE R&B

## 2024-11-30 PROCEDURE — 6370000000 HC RX 637 (ALT 250 FOR IP)

## 2024-11-30 PROCEDURE — 36415 COLL VENOUS BLD VENIPUNCTURE: CPT

## 2024-11-30 PROCEDURE — 83735 ASSAY OF MAGNESIUM: CPT

## 2024-11-30 PROCEDURE — 2580000003 HC RX 258: Performed by: HOSPITALIST

## 2024-11-30 PROCEDURE — 80069 RENAL FUNCTION PANEL: CPT

## 2024-11-30 PROCEDURE — 6370000000 HC RX 637 (ALT 250 FOR IP): Performed by: STUDENT IN AN ORGANIZED HEALTH CARE EDUCATION/TRAINING PROGRAM

## 2024-11-30 PROCEDURE — 6370000000 HC RX 637 (ALT 250 FOR IP): Performed by: HOSPITALIST

## 2024-11-30 PROCEDURE — 94761 N-INVAS EAR/PLS OXIMETRY MLT: CPT

## 2024-11-30 PROCEDURE — 85610 PROTHROMBIN TIME: CPT

## 2024-11-30 RX ADMIN — FUROSEMIDE 20 MG: 20 TABLET ORAL at 08:21

## 2024-11-30 RX ADMIN — IPRATROPIUM BROMIDE 0.5 MG: 0.5 SOLUTION RESPIRATORY (INHALATION) at 14:55

## 2024-11-30 RX ADMIN — PRAVASTATIN SODIUM 40 MG: 20 TABLET ORAL at 20:57

## 2024-11-30 RX ADMIN — HEPARIN SODIUM 13 UNITS/KG/HR: 10000 INJECTION, SOLUTION INTRAVENOUS at 01:38

## 2024-11-30 RX ADMIN — SODIUM CHLORIDE, PRESERVATIVE FREE 10 ML: 5 INJECTION INTRAVENOUS at 20:57

## 2024-11-30 RX ADMIN — LORAZEPAM 0.5 MG: 0.5 TABLET ORAL at 08:21

## 2024-11-30 RX ADMIN — QUETIAPINE FUMARATE 25 MG: 25 TABLET ORAL at 20:57

## 2024-11-30 RX ADMIN — LORAZEPAM 0.5 MG: 0.5 TABLET ORAL at 15:18

## 2024-11-30 RX ADMIN — ARFORMOTEROL TARTRATE 15 MCG: 15 SOLUTION RESPIRATORY (INHALATION) at 20:14

## 2024-11-30 RX ADMIN — LEVALBUTEROL HYDROCHLORIDE 1.25 MG: 1.25 SOLUTION RESPIRATORY (INHALATION) at 20:17

## 2024-11-30 RX ADMIN — GUAIFENESIN 200 MG: 200 SOLUTION ORAL at 20:57

## 2024-11-30 RX ADMIN — IPRATROPIUM BROMIDE 0.5 MG: 0.5 SOLUTION RESPIRATORY (INHALATION) at 07:28

## 2024-11-30 RX ADMIN — LEVALBUTEROL HYDROCHLORIDE 1.25 MG: 1.25 SOLUTION RESPIRATORY (INHALATION) at 14:55

## 2024-11-30 RX ADMIN — MORPHINE SULFATE 2 MG: 2 INJECTION, SOLUTION INTRAMUSCULAR; INTRAVENOUS at 08:21

## 2024-11-30 RX ADMIN — PREDNISONE 30 MG: 10 TABLET ORAL at 08:20

## 2024-11-30 RX ADMIN — SODIUM CHLORIDE, PRESERVATIVE FREE 10 ML: 5 INJECTION INTRAVENOUS at 09:00

## 2024-11-30 RX ADMIN — IPRATROPIUM BROMIDE 0.5 MG: 0.5 SOLUTION RESPIRATORY (INHALATION) at 20:18

## 2024-11-30 RX ADMIN — LEVALBUTEROL HYDROCHLORIDE 1.25 MG: 1.25 SOLUTION RESPIRATORY (INHALATION) at 07:28

## 2024-11-30 RX ADMIN — BUDESONIDE INHALATION 500 MCG: 0.5 SUSPENSION RESPIRATORY (INHALATION) at 20:14

## 2024-11-30 RX ADMIN — PANTOPRAZOLE SODIUM 40 MG: 40 TABLET, DELAYED RELEASE ORAL at 06:40

## 2024-11-30 RX ADMIN — PIPERACILLIN AND TAZOBACTAM 3375 MG: 3; .375 INJECTION, POWDER, LYOPHILIZED, FOR SOLUTION INTRAVENOUS at 18:20

## 2024-11-30 RX ADMIN — ARFORMOTEROL TARTRATE 15 MCG: 15 SOLUTION RESPIRATORY (INHALATION) at 07:34

## 2024-11-30 RX ADMIN — PIPERACILLIN AND TAZOBACTAM 3375 MG: 3; .375 INJECTION, POWDER, LYOPHILIZED, FOR SOLUTION INTRAVENOUS at 01:41

## 2024-11-30 RX ADMIN — Medication 9 MG: at 20:57

## 2024-11-30 RX ADMIN — BUDESONIDE INHALATION 500 MCG: 0.5 SUSPENSION RESPIRATORY (INHALATION) at 07:34

## 2024-11-30 RX ADMIN — PIPERACILLIN AND TAZOBACTAM 3375 MG: 3; .375 INJECTION, POWDER, LYOPHILIZED, FOR SOLUTION INTRAVENOUS at 11:31

## 2024-11-30 RX ADMIN — LORAZEPAM 0.5 MG: 0.5 TABLET ORAL at 20:56

## 2024-11-30 ASSESSMENT — PAIN DESCRIPTION - ORIENTATION: ORIENTATION: POSTERIOR

## 2024-11-30 ASSESSMENT — PAIN DESCRIPTION - LOCATION: LOCATION: BACK

## 2024-11-30 ASSESSMENT — PAIN - FUNCTIONAL ASSESSMENT: PAIN_FUNCTIONAL_ASSESSMENT: ACTIVITIES ARE NOT PREVENTED

## 2024-11-30 ASSESSMENT — PAIN SCALES - GENERAL: PAINLEVEL_OUTOF10: 5

## 2024-11-30 ASSESSMENT — PAIN DESCRIPTION - DESCRIPTORS: DESCRIPTORS: DISCOMFORT;ACHING

## 2024-12-01 LAB
ALBUMIN SERPL-MCNC: 2.2 G/DL (ref 3.5–5)
ALBUMIN SERPL-MCNC: 2.2 G/DL (ref 3.5–5)
ALBUMIN/GLOB SERPL: 0.8 (ref 1.1–2.2)
ALP SERPL-CCNC: 66 U/L (ref 45–117)
ALT SERPL-CCNC: 39 U/L (ref 12–78)
ANION GAP SERPL CALC-SCNC: 5 MMOL/L (ref 2–12)
AST SERPL-CCNC: 21 U/L (ref 15–37)
BILIRUB DIRECT SERPL-MCNC: 0.2 MG/DL (ref 0–0.2)
BILIRUB SERPL-MCNC: 0.4 MG/DL (ref 0.2–1)
BUN SERPL-MCNC: 15 MG/DL (ref 6–20)
BUN/CREAT SERPL: 19 (ref 12–20)
CALCIUM SERPL-MCNC: 9.3 MG/DL (ref 8.5–10.1)
CHLORIDE SERPL-SCNC: 98 MMOL/L (ref 97–108)
CO2 SERPL-SCNC: 34 MMOL/L (ref 21–32)
CREAT SERPL-MCNC: 0.8 MG/DL (ref 0.7–1.3)
ERYTHROCYTE [DISTWIDTH] IN BLOOD BY AUTOMATED COUNT: 13.7 % (ref 11.5–14.5)
GLOBULIN SER CALC-MCNC: 2.8 G/DL (ref 2–4)
GLUCOSE SERPL-MCNC: 106 MG/DL (ref 65–100)
HCT VFR BLD AUTO: 32.4 % (ref 36.6–50.3)
HGB BLD-MCNC: 10.5 G/DL (ref 12.1–17)
INR PPP: 1.1 (ref 0.9–1.1)
MAGNESIUM SERPL-MCNC: 2.1 MG/DL (ref 1.6–2.4)
MCH RBC QN AUTO: 31.5 PG (ref 26–34)
MCHC RBC AUTO-ENTMCNC: 32.4 G/DL (ref 30–36.5)
MCV RBC AUTO: 97.3 FL (ref 80–99)
NRBC # BLD: 0 K/UL (ref 0–0.01)
NRBC BLD-RTO: 0 PER 100 WBC
PHOSPHATE SERPL-MCNC: 2.6 MG/DL (ref 2.6–4.7)
PLATELET # BLD AUTO: 224 K/UL (ref 150–400)
PMV BLD AUTO: 9.4 FL (ref 8.9–12.9)
POTASSIUM SERPL-SCNC: 3.8 MMOL/L (ref 3.5–5.1)
PROT SERPL-MCNC: 5 G/DL (ref 6.4–8.2)
PROTHROMBIN TIME: 11.4 SEC (ref 9–11.1)
RBC # BLD AUTO: 3.33 M/UL (ref 4.1–5.7)
SODIUM SERPL-SCNC: 137 MMOL/L (ref 136–145)
UFH PPP CHRO-ACNC: 0.39 IU/ML
WBC # BLD AUTO: 8.4 K/UL (ref 4.1–11.1)

## 2024-12-01 PROCEDURE — 6370000000 HC RX 637 (ALT 250 FOR IP): Performed by: INTERNAL MEDICINE

## 2024-12-01 PROCEDURE — 6370000000 HC RX 637 (ALT 250 FOR IP): Performed by: STUDENT IN AN ORGANIZED HEALTH CARE EDUCATION/TRAINING PROGRAM

## 2024-12-01 PROCEDURE — 6360000002 HC RX W HCPCS: Performed by: NURSE PRACTITIONER

## 2024-12-01 PROCEDURE — 80069 RENAL FUNCTION PANEL: CPT

## 2024-12-01 PROCEDURE — 83735 ASSAY OF MAGNESIUM: CPT

## 2024-12-01 PROCEDURE — 85520 HEPARIN ASSAY: CPT

## 2024-12-01 PROCEDURE — 94761 N-INVAS EAR/PLS OXIMETRY MLT: CPT

## 2024-12-01 PROCEDURE — 85610 PROTHROMBIN TIME: CPT

## 2024-12-01 PROCEDURE — 2700000000 HC OXYGEN THERAPY PER DAY

## 2024-12-01 PROCEDURE — 2060000000 HC ICU INTERMEDIATE R&B

## 2024-12-01 PROCEDURE — 85027 COMPLETE CBC AUTOMATED: CPT

## 2024-12-01 PROCEDURE — 6370000000 HC RX 637 (ALT 250 FOR IP): Performed by: HOSPITALIST

## 2024-12-01 PROCEDURE — 6360000002 HC RX W HCPCS: Performed by: INTERNAL MEDICINE

## 2024-12-01 PROCEDURE — 6370000000 HC RX 637 (ALT 250 FOR IP)

## 2024-12-01 PROCEDURE — 94640 AIRWAY INHALATION TREATMENT: CPT

## 2024-12-01 PROCEDURE — 80076 HEPATIC FUNCTION PANEL: CPT

## 2024-12-01 PROCEDURE — 5A09357 ASSISTANCE WITH RESPIRATORY VENTILATION, LESS THAN 24 CONSECUTIVE HOURS, CONTINUOUS POSITIVE AIRWAY PRESSURE: ICD-10-PCS | Performed by: HOSPITALIST

## 2024-12-01 PROCEDURE — 2580000003 HC RX 258: Performed by: HOSPITALIST

## 2024-12-01 PROCEDURE — 36415 COLL VENOUS BLD VENIPUNCTURE: CPT

## 2024-12-01 RX ORDER — LIDOCAINE 4 G/G
1 PATCH TOPICAL DAILY
Status: DISCONTINUED | OUTPATIENT
Start: 2024-12-02 | End: 2024-12-03 | Stop reason: HOSPADM

## 2024-12-01 RX ORDER — PREDNISONE 20 MG/1
20 TABLET ORAL DAILY
Status: DISCONTINUED | OUTPATIENT
Start: 2024-12-02 | End: 2024-12-02

## 2024-12-01 RX ORDER — GUAIFENESIN 600 MG/1
600 TABLET, EXTENDED RELEASE ORAL EVERY EVENING
Status: DISCONTINUED | OUTPATIENT
Start: 2024-12-01 | End: 2024-12-03 | Stop reason: HOSPADM

## 2024-12-01 RX ADMIN — IPRATROPIUM BROMIDE 0.5 MG: 0.5 SOLUTION RESPIRATORY (INHALATION) at 13:00

## 2024-12-01 RX ADMIN — LEVALBUTEROL HYDROCHLORIDE 1.25 MG: 1.25 SOLUTION RESPIRATORY (INHALATION) at 13:00

## 2024-12-01 RX ADMIN — IPRATROPIUM BROMIDE 0.5 MG: 0.5 SOLUTION RESPIRATORY (INHALATION) at 07:50

## 2024-12-01 RX ADMIN — LEVALBUTEROL HYDROCHLORIDE 1.25 MG: 1.25 SOLUTION RESPIRATORY (INHALATION) at 07:50

## 2024-12-01 RX ADMIN — SODIUM CHLORIDE, PRESERVATIVE FREE 10 ML: 5 INJECTION INTRAVENOUS at 09:14

## 2024-12-01 RX ADMIN — Medication 9 MG: at 21:48

## 2024-12-01 RX ADMIN — QUETIAPINE FUMARATE 25 MG: 25 TABLET ORAL at 21:49

## 2024-12-01 RX ADMIN — MORPHINE SULFATE 2 MG: 2 INJECTION, SOLUTION INTRAMUSCULAR; INTRAVENOUS at 21:55

## 2024-12-01 RX ADMIN — GUAIFENESIN 600 MG: 600 TABLET ORAL at 21:48

## 2024-12-01 RX ADMIN — IPRATROPIUM BROMIDE 0.5 MG: 0.5 SOLUTION RESPIRATORY (INHALATION) at 19:27

## 2024-12-01 RX ADMIN — ARFORMOTEROL TARTRATE 15 MCG: 15 SOLUTION RESPIRATORY (INHALATION) at 07:57

## 2024-12-01 RX ADMIN — PREDNISONE 30 MG: 10 TABLET ORAL at 09:11

## 2024-12-01 RX ADMIN — FUROSEMIDE 20 MG: 20 TABLET ORAL at 09:10

## 2024-12-01 RX ADMIN — PRAVASTATIN SODIUM 40 MG: 20 TABLET ORAL at 21:48

## 2024-12-01 RX ADMIN — APIXABAN 10 MG: 5 TABLET, FILM COATED ORAL at 10:39

## 2024-12-01 RX ADMIN — LORAZEPAM 0.5 MG: 0.5 TABLET ORAL at 21:48

## 2024-12-01 RX ADMIN — ARFORMOTEROL TARTRATE 15 MCG: 15 SOLUTION RESPIRATORY (INHALATION) at 19:27

## 2024-12-01 RX ADMIN — BUDESONIDE INHALATION 500 MCG: 0.5 SUSPENSION RESPIRATORY (INHALATION) at 07:57

## 2024-12-01 RX ADMIN — LEVALBUTEROL HYDROCHLORIDE 1.25 MG: 1.25 SOLUTION RESPIRATORY (INHALATION) at 19:27

## 2024-12-01 RX ADMIN — SODIUM CHLORIDE, PRESERVATIVE FREE 10 ML: 5 INJECTION INTRAVENOUS at 21:48

## 2024-12-01 RX ADMIN — APIXABAN 10 MG: 5 TABLET, FILM COATED ORAL at 21:48

## 2024-12-01 RX ADMIN — BUDESONIDE INHALATION 500 MCG: 0.5 SUSPENSION RESPIRATORY (INHALATION) at 19:27

## 2024-12-01 ASSESSMENT — PAIN SCALES - GENERAL
PAINLEVEL_OUTOF10: 0
PAINLEVEL_OUTOF10: 1
PAINLEVEL_OUTOF10: 4

## 2024-12-02 ENCOUNTER — APPOINTMENT (OUTPATIENT)
Facility: HOSPITAL | Age: 79
DRG: 180 | End: 2024-12-02
Payer: MEDICARE

## 2024-12-02 LAB
ALBUMIN SERPL-MCNC: 2.2 G/DL (ref 3.5–5)
ANION GAP SERPL CALC-SCNC: 1 MMOL/L (ref 2–12)
BACTERIA SPEC CULT: NORMAL
BUN SERPL-MCNC: 12 MG/DL (ref 6–20)
BUN/CREAT SERPL: 20 (ref 12–20)
CALCIUM SERPL-MCNC: 9.3 MG/DL (ref 8.5–10.1)
CHLORIDE SERPL-SCNC: 99 MMOL/L (ref 97–108)
CO2 SERPL-SCNC: 38 MMOL/L (ref 21–32)
CREAT SERPL-MCNC: 0.59 MG/DL (ref 0.7–1.3)
GLUCOSE SERPL-MCNC: 93 MG/DL (ref 65–100)
INR PPP: 1.1 (ref 0.9–1.1)
MAGNESIUM SERPL-MCNC: 2 MG/DL (ref 1.6–2.4)
PHOSPHATE SERPL-MCNC: 2.9 MG/DL (ref 2.6–4.7)
POTASSIUM SERPL-SCNC: 4 MMOL/L (ref 3.5–5.1)
PROTHROMBIN TIME: 11.5 SEC (ref 9–11.1)
SERVICE CMNT-IMP: NORMAL
SODIUM SERPL-SCNC: 138 MMOL/L (ref 136–145)

## 2024-12-02 PROCEDURE — 99233 SBSQ HOSP IP/OBS HIGH 50: CPT | Performed by: INTERNAL MEDICINE

## 2024-12-02 PROCEDURE — 80069 RENAL FUNCTION PANEL: CPT

## 2024-12-02 PROCEDURE — 2700000000 HC OXYGEN THERAPY PER DAY

## 2024-12-02 PROCEDURE — 94640 AIRWAY INHALATION TREATMENT: CPT

## 2024-12-02 PROCEDURE — 99233 SBSQ HOSP IP/OBS HIGH 50: CPT | Performed by: NURSE PRACTITIONER

## 2024-12-02 PROCEDURE — 6370000000 HC RX 637 (ALT 250 FOR IP)

## 2024-12-02 PROCEDURE — 85610 PROTHROMBIN TIME: CPT

## 2024-12-02 PROCEDURE — 2580000003 HC RX 258: Performed by: HOSPITALIST

## 2024-12-02 PROCEDURE — 83735 ASSAY OF MAGNESIUM: CPT

## 2024-12-02 PROCEDURE — 6360000002 HC RX W HCPCS: Performed by: NURSE PRACTITIONER

## 2024-12-02 PROCEDURE — 36415 COLL VENOUS BLD VENIPUNCTURE: CPT

## 2024-12-02 PROCEDURE — 94761 N-INVAS EAR/PLS OXIMETRY MLT: CPT

## 2024-12-02 PROCEDURE — 6370000000 HC RX 637 (ALT 250 FOR IP): Performed by: HOSPITALIST

## 2024-12-02 PROCEDURE — 2060000000 HC ICU INTERMEDIATE R&B

## 2024-12-02 PROCEDURE — 6370000000 HC RX 637 (ALT 250 FOR IP): Performed by: NURSE PRACTITIONER

## 2024-12-02 PROCEDURE — 6360000002 HC RX W HCPCS: Performed by: INTERNAL MEDICINE

## 2024-12-02 PROCEDURE — 6370000000 HC RX 637 (ALT 250 FOR IP): Performed by: INTERNAL MEDICINE

## 2024-12-02 PROCEDURE — 71045 X-RAY EXAM CHEST 1 VIEW: CPT

## 2024-12-02 PROCEDURE — 80076 HEPATIC FUNCTION PANEL: CPT

## 2024-12-02 RX ORDER — QUETIAPINE FUMARATE 25 MG/1
50 TABLET, FILM COATED ORAL ONCE
Status: COMPLETED | OUTPATIENT
Start: 2024-12-02 | End: 2024-12-02

## 2024-12-02 RX ORDER — PREDNISONE 20 MG/1
20 TABLET ORAL DAILY
Status: DISCONTINUED | OUTPATIENT
Start: 2024-12-03 | End: 2024-12-03 | Stop reason: HOSPADM

## 2024-12-02 RX ADMIN — OXYCODONE 5 MG: 5 TABLET ORAL at 22:13

## 2024-12-02 RX ADMIN — IPRATROPIUM BROMIDE 0.5 MG: 0.5 SOLUTION RESPIRATORY (INHALATION) at 18:21

## 2024-12-02 RX ADMIN — ARFORMOTEROL TARTRATE 15 MCG: 15 SOLUTION RESPIRATORY (INHALATION) at 18:21

## 2024-12-02 RX ADMIN — FUROSEMIDE 20 MG: 20 TABLET ORAL at 08:33

## 2024-12-02 RX ADMIN — Medication 9 MG: at 22:14

## 2024-12-02 RX ADMIN — APIXABAN 10 MG: 5 TABLET, FILM COATED ORAL at 21:05

## 2024-12-02 RX ADMIN — PANTOPRAZOLE SODIUM 40 MG: 40 TABLET, DELAYED RELEASE ORAL at 06:26

## 2024-12-02 RX ADMIN — MORPHINE SULFATE 2 MG: 2 INJECTION, SOLUTION INTRAMUSCULAR; INTRAVENOUS at 15:03

## 2024-12-02 RX ADMIN — GUAIFENESIN 600 MG: 600 TABLET ORAL at 17:18

## 2024-12-02 RX ADMIN — LEVALBUTEROL HYDROCHLORIDE 1.25 MG: 1.25 SOLUTION RESPIRATORY (INHALATION) at 14:04

## 2024-12-02 RX ADMIN — LEVALBUTEROL HYDROCHLORIDE 1.25 MG: 1.25 SOLUTION RESPIRATORY (INHALATION) at 18:21

## 2024-12-02 RX ADMIN — ARFORMOTEROL TARTRATE 15 MCG: 15 SOLUTION RESPIRATORY (INHALATION) at 07:39

## 2024-12-02 RX ADMIN — IPRATROPIUM BROMIDE 0.5 MG: 0.5 SOLUTION RESPIRATORY (INHALATION) at 07:39

## 2024-12-02 RX ADMIN — PREDNISONE 20 MG: 20 TABLET ORAL at 08:33

## 2024-12-02 RX ADMIN — PRAVASTATIN SODIUM 40 MG: 20 TABLET ORAL at 21:05

## 2024-12-02 RX ADMIN — SODIUM CHLORIDE, PRESERVATIVE FREE 10 ML: 5 INJECTION INTRAVENOUS at 08:34

## 2024-12-02 RX ADMIN — OXYCODONE 5 MG: 5 TABLET ORAL at 01:16

## 2024-12-02 RX ADMIN — APIXABAN 10 MG: 5 TABLET, FILM COATED ORAL at 08:33

## 2024-12-02 RX ADMIN — BUDESONIDE INHALATION 500 MCG: 0.5 SUSPENSION RESPIRATORY (INHALATION) at 18:21

## 2024-12-02 RX ADMIN — BUDESONIDE INHALATION 500 MCG: 0.5 SUSPENSION RESPIRATORY (INHALATION) at 07:39

## 2024-12-02 RX ADMIN — LORAZEPAM 0.5 MG: 0.5 TABLET ORAL at 18:53

## 2024-12-02 RX ADMIN — LORAZEPAM 0.5 MG: 0.5 TABLET ORAL at 12:17

## 2024-12-02 RX ADMIN — QUETIAPINE FUMARATE 50 MG: 25 TABLET ORAL at 22:14

## 2024-12-02 RX ADMIN — LEVALBUTEROL HYDROCHLORIDE 1.25 MG: 1.25 SOLUTION RESPIRATORY (INHALATION) at 07:39

## 2024-12-02 RX ADMIN — IPRATROPIUM BROMIDE 0.5 MG: 0.5 SOLUTION RESPIRATORY (INHALATION) at 14:04

## 2024-12-02 ASSESSMENT — PAIN DESCRIPTION - DESCRIPTORS: DESCRIPTORS: ACHING

## 2024-12-02 ASSESSMENT — PAIN SCALES - GENERAL
PAINLEVEL_OUTOF10: 4
PAINLEVEL_OUTOF10: 5
PAINLEVEL_OUTOF10: 5
PAINLEVEL_OUTOF10: 0

## 2024-12-02 ASSESSMENT — PAIN DESCRIPTION - LOCATION
LOCATION: BACK
LOCATION: BACK

## 2024-12-02 ASSESSMENT — PAIN DESCRIPTION - ORIENTATION: ORIENTATION: LOWER

## 2024-12-02 NOTE — ACP (ADVANCE CARE PLANNING)
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     Discussion:     Persons present and participating in discussion: Davis Dixon, Aidan Sanderson MD,     Topics Discussed:  Patient's medical condition and diagnosis: [ x ] yes [  ] no   Surrogate decision maker: [  ] yes [  x] no   Patient's current physical function/cognitive function/frailty: [ x ] yes [  ] no   Code Status: [ x yes [  ] no   Artificial Nutrition / Dialysis / Non-Invasive Ventilation / Blood Transfusion: [  ] yes [ x ] no  Potential Resources for home (durable medical equipment, home nursing, home O2): [  x] yes [  ] no    Overview of Discussion: Reviewed his cancer diagnosis and potential for staging inpatient. He does not want to do this (MRI). We talked about whether he would want treatment at all and he says he wants to \"think about it.\" We speak a little bit about Hospice and this seems more in line with his GOC, but again, he would like to \"think about it.\"     Time Spent:     Total time spent face-to-face in education and discussion: 20 minutes.     Aidan Sanderson MD  Date of Service:  12/2/2024  10:57 AM

## 2024-12-02 NOTE — CARE COORDINATION
12/2/24  3:33 PM    Care Management Progress Note    Reason for Admission:   Pleural effusion [J90]  Elevated troponin [R79.89]  Acute on chronic respiratory failure with hypoxia [J96.21]  Acute hypoxemic respiratory failure [J96.01]  Acute on chronic congestive heart failure, unspecified heart failure type (HCC) [I50.9]         Patient Admission Status: Inpatient  RUR: 13%  Hospitalization in the last 30 days (Readmission):  No        Transition of care plan:  Ongoing medical management  Discharge plan: dispo pending improvement and respiratory status- declined PT today.   Date 1st IMM letter given: 11/20/24  Outpatient follow-up.  Transport at discharge: Family    Patient remains on HFNC today at 5L. Oncology met with patient today and he is considering his options and remains undecided about whether he wants to pursue further management and treatment of his condition or hospice. CM following for decisions.     ISABEL Shaw  [facility]      Available via [employer]

## 2024-12-03 VITALS
OXYGEN SATURATION: 99 % | HEART RATE: 97 BPM | DIASTOLIC BLOOD PRESSURE: 68 MMHG | WEIGHT: 127.3 LBS | TEMPERATURE: 98.4 F | RESPIRATION RATE: 16 BRPM | HEIGHT: 67 IN | BODY MASS INDEX: 19.98 KG/M2 | SYSTOLIC BLOOD PRESSURE: 109 MMHG

## 2024-12-03 PROBLEM — C80.1 ADENOCARCINOMA (HCC): Status: ACTIVE | Noted: 2024-12-03

## 2024-12-03 LAB
ALBUMIN SERPL-MCNC: 2.1 G/DL (ref 3.5–5)
ALBUMIN/GLOB SERPL: 0.6 (ref 1.1–2.2)
ALP SERPL-CCNC: 62 U/L (ref 45–117)
ALT SERPL-CCNC: 50 U/L (ref 12–78)
AST SERPL-CCNC: 33 U/L (ref 15–37)
BILIRUB DIRECT SERPL-MCNC: 0.1 MG/DL (ref 0–0.2)
BILIRUB SERPL-MCNC: 0.4 MG/DL (ref 0.2–1)
GLOBULIN SER CALC-MCNC: 3.3 G/DL (ref 2–4)
PROT SERPL-MCNC: 5.4 G/DL (ref 6.4–8.2)

## 2024-12-03 PROCEDURE — 6370000000 HC RX 637 (ALT 250 FOR IP): Performed by: INTERNAL MEDICINE

## 2024-12-03 PROCEDURE — 6360000002 HC RX W HCPCS: Performed by: INTERNAL MEDICINE

## 2024-12-03 PROCEDURE — 2580000003 HC RX 258: Performed by: HOSPITALIST

## 2024-12-03 PROCEDURE — 94640 AIRWAY INHALATION TREATMENT: CPT

## 2024-12-03 PROCEDURE — 6360000002 HC RX W HCPCS: Performed by: NURSE PRACTITIONER

## 2024-12-03 PROCEDURE — 99233 SBSQ HOSP IP/OBS HIGH 50: CPT | Performed by: INTERNAL MEDICINE

## 2024-12-03 PROCEDURE — 94761 N-INVAS EAR/PLS OXIMETRY MLT: CPT

## 2024-12-03 PROCEDURE — 6370000000 HC RX 637 (ALT 250 FOR IP)

## 2024-12-03 PROCEDURE — 2700000000 HC OXYGEN THERAPY PER DAY

## 2024-12-03 PROCEDURE — 6370000000 HC RX 637 (ALT 250 FOR IP): Performed by: HOSPITALIST

## 2024-12-03 RX ORDER — PREDNISONE 20 MG/1
20 TABLET ORAL DAILY
Qty: 5 TABLET | Refills: 0 | Status: SHIPPED | OUTPATIENT
Start: 2024-12-04 | End: 2024-12-09

## 2024-12-03 RX ADMIN — APIXABAN 10 MG: 5 TABLET, FILM COATED ORAL at 08:21

## 2024-12-03 RX ADMIN — PREDNISONE 20 MG: 20 TABLET ORAL at 08:22

## 2024-12-03 RX ADMIN — LORAZEPAM 0.5 MG: 0.5 TABLET ORAL at 11:58

## 2024-12-03 RX ADMIN — PANTOPRAZOLE SODIUM 40 MG: 40 TABLET, DELAYED RELEASE ORAL at 06:04

## 2024-12-03 RX ADMIN — IPRATROPIUM BROMIDE 0.5 MG: 0.5 SOLUTION RESPIRATORY (INHALATION) at 07:24

## 2024-12-03 RX ADMIN — LEVALBUTEROL HYDROCHLORIDE 1.25 MG: 1.25 SOLUTION RESPIRATORY (INHALATION) at 13:16

## 2024-12-03 RX ADMIN — LEVALBUTEROL HYDROCHLORIDE 1.25 MG: 1.25 SOLUTION RESPIRATORY (INHALATION) at 07:24

## 2024-12-03 RX ADMIN — FUROSEMIDE 20 MG: 20 TABLET ORAL at 08:22

## 2024-12-03 RX ADMIN — IPRATROPIUM BROMIDE 0.5 MG: 0.5 SOLUTION RESPIRATORY (INHALATION) at 13:16

## 2024-12-03 RX ADMIN — SODIUM CHLORIDE, PRESERVATIVE FREE 10 ML: 5 INJECTION INTRAVENOUS at 08:39

## 2024-12-03 RX ADMIN — ARFORMOTEROL TARTRATE 15 MCG: 15 SOLUTION RESPIRATORY (INHALATION) at 07:24

## 2024-12-03 RX ADMIN — BUDESONIDE INHALATION 500 MCG: 0.5 SUSPENSION RESPIRATORY (INHALATION) at 07:24

## 2024-12-03 NOTE — DISCHARGE SUMMARY
DVT of the right soleal vein, POA  Acute DVT of the right peroneal vein, POA  As above     Acute COPD exacerbation  Home on steroids     AAA status post endograft in 2019  Possible endoleak as above  Vascular surgery saw     Troponin elevation, likely nonischemic elevation  No aggressive management at this time.    Imaging  XR CHEST PORTABLE    Result Date: 12/2/2024  Stable small right pleural effusion with adjacent airspace disease. Electronically signed by RYDER MURRELL    XR CHEST PORTABLE    Result Date: 11/30/2024  No significant interval change. Electronically signed by Richmond Ross    XR CHEST PORTABLE    Result Date: 11/28/2024  Stable right apical pneumothorax Stable left basilar subsegmental atelectasis. Electronically signed by Letty Jones    XR CHEST PORTABLE    Result Date: 11/27/2024  1. Small right-sided pneumothorax has minimally increased and continued close follow-up is suggested. Additionally, subcutaneous air along the right lateral chest wall has increased. 2. There is persistent fairly severe atelectasis in the right lower lobe with small right pleural effusion. Electronically signed by HARRY BUENROSTRO    XR CHEST PORTABLE    Result Date: 11/27/2024  1. Small right-sided pneumothorax is noted at the right lung apex and at the right lung base. Close follow-up is suggested. There is new subcutaneous air along the right chest wall. Electronically signed by HARRY BUENROSTRO    XR CHEST PORTABLE    Result Date: 11/27/2024  No pneumothorax is identified. Bilateral interstitial opacities unchanged. Electronically signed by Jacob Moore    XR CHEST PORTABLE    Result Date: 11/26/2024  Right pleural drain in place. No pneumothorax. Electronically signed by VIRGINIA SHI       PCP: Yefri Xiao II, MD     Consults: pulmonary/intensive care and Palliative    Condition of patient at discharge: Fair    Discharge Exam:    Physical Exam:    Gen: Well-developed, well-nourished, in no acute distress  HEENT:

## 2024-12-03 NOTE — CARE COORDINATION
Care Management Progress Note    Reason for Admission:   Pleural effusion [J90]  Elevated troponin [R79.89]  Acute on chronic respiratory failure with hypoxia [J96.21]  Acute hypoxemic respiratory failure [J96.01]  Acute on chronic congestive heart failure, unspecified heart failure type (HCC) [I50.9]      Patient Admission Status: Inpatient  RUR: 13%  Hospitalization in the last 30 days (Readmission):  No        Transition of care plan:  Medical management continues  Discharge Plan:  Home with home hospice  Discharge plan communicated with patient and/or discharge caregiver: yes  Date 1st IMM letter given: 11/20/24  Outpatient follow-up  Transport at discharge:  Stretcher    11:18 am:  [facility].  Will arrange for transport around 2 pm.    9:41am:  DASHAWN confirmed Hospice choice with patient's daughter.  Order received and sent via [employer] to [email or web address].     12/03/24 0944   Condition of Participation: Discharge Planning   The Patient and/or Patient Representative was provided with a Choice of Provider? Patient Representative   Name of the Patient Representative who was provided with the Choice of Provider and agrees with the Discharge Plan?  Daughter, Caroline   The Patient and/Or Patient Representative agree with the Discharge Plan? Yes   Freedom of Choice list was provided with basic dialogue that supports the patient's individualized plan of care/goals, treatment preferences, and shares the quality data associated with the providers?  Yes

## 2024-12-03 NOTE — PLAN OF CARE
Problem: Discharge Planning  Goal: Discharge to home or other facility with appropriate resources  11/25/2024 1005 by Dixie Hoff RN  Outcome: /[facility] Progressing  11/25/2024 0626 by Portia Gross RN  Outcome: Progressing     Problem: Pain  Goal: Verbalizes/displays adequate comfort level or baseline comfort level  11/25/2024 1005 by Dixie Hoff RN  Outcome: /[facility] Progressing  11/25/2024 0626 by Portia Gross RN  Outcome: Progressing     Problem: Skin/Tissue Integrity  Goal: Absence of new skin breakdown  Description: 1.  Monitor for areas of redness and/or skin breakdown  2.  Assess vascular access sites hourly  3.  Every 4-6 hours minimum:  Change oxygen saturation probe site  4.  Every 4-6 hours:  If on nasal continuous positive airway pressure, respiratory therapy assess nares and determine need for appliance change or resting period.  11/25/2024 1005 by Dixie Hoff RN  Outcome: /[facility] Progressing  11/25/2024 0626 by Portia Gross RN  Outcome: Progressing     Problem: Safety - Adult  Goal: Free from fall injury  11/25/2024 1005 by Dixie Hoff RN  Outcome: /[facility] Progressing  11/25/2024 0626 by Portia Gross RN  Outcome: Progressing     Problem: ABCDS Injury Assessment  Goal: Absence of physical injury  11/25/2024 1005 by Dixie Hoff RN  Outcome: /[facility] Progressing  11/25/2024 0626 by Portia Gross RN  Outcome: Progressing     Problem: Respiratory - Adult  Goal: Achieves optimal ventilation and oxygenation  11/25/2024 1005 by Dixie Hoff RN  Outcome: /[facility] Progressing  11/25/2024 0626 by oPrtia Gross RN  Outcome: Progressing     Problem: Nutrition Deficit:  Goal: Optimize nutritional status  11/25/2024 1005 by Dixie Hoff RN  Outcome: /[facility] Progressing  11/25/2024 0626 by Portia Gross RN  Outcome: Progressing     
  Problem: Discharge Planning  Goal: Discharge to home or other facility with appropriate resources  11/26/2024 0757 by Dixie Hoff RN  Outcome: [facility] Progressing  11/26/2024 0143 by Andres Stevens RN  Outcome: Progressing  Flowsheets (Taken 11/25/2024 1958)  Discharge to home or other facility with appropriate resources:   Identify barriers to discharge with patient and caregiver   Arrange for needed discharge resources and transportation as appropriate   Identify discharge learning needs (meds, wound care, etc)     Problem: Pain  Goal: Verbalizes/displays adequate comfort level or baseline comfort level  11/26/2024 0757 by Dixie Hoff RN  Outcome: [facility] Progressing  Flowsheets (Taken 11/26/2024 0354 by nAdres Stevens RN)  Verbalizes/displays adequate comfort level or baseline comfort level:   Encourage patient to monitor pain and request assistance   Assess pain using appropriate pain scale   Administer analgesics based on type and severity of pain and evaluate response  11/26/2024 0143 by Andres Stevens RN  Outcome: Progressing  Flowsheets (Taken 11/25/2024 1958)  Verbalizes/displays adequate comfort level or baseline comfort level:   Encourage patient to monitor pain and request assistance   Assess pain using appropriate pain scale     Problem: Skin/Tissue Integrity  Goal: Absence of new skin breakdown  Description: 1.  Monitor for areas of redness and/or skin breakdown  2.  Assess vascular access sites hourly  3.  Every 4-6 hours minimum:  Change oxygen saturation probe site  4.  Every 4-6 hours:  If on nasal continuous positive airway pressure, respiratory therapy assess nares and determine need for appliance change or resting period.  11/26/2024 0757 by Dixie Hoff RN  Outcome: [facility] Progressing  11/26/2024 0143 by Andres Stevens RN  Outcome: Progressing     Problem: Safety - Adult  Goal: Free from fall injury  11/26/2024 0757 by Dixie Hoff RN  Outcome: [facility] Progressing  11/26/2024 
  Problem: Discharge Planning  Goal: Discharge to home or other facility with appropriate resources  12/2/2024 1152 by Alexander Brown RN  Outcome: Progressing  Flowsheets (Taken 12/2/2024 0745)  Discharge to home or other facility with appropriate resources: Identify barriers to discharge with patient and caregiver  12/1/2024 2326 by Cierra Alexander RN  Outcome: Progressing  Flowsheets (Taken 12/1/2024 1938)  Discharge to home or other facility with appropriate resources:   Identify barriers to discharge with patient and caregiver   Identify discharge learning needs (meds, wound care, etc)   Refer to discharge planning if patient needs post-hospital services based on physician order or complex needs related to functional status, cognitive ability or social support system     Problem: Pain  Goal: Verbalizes/displays adequate comfort level or baseline comfort level  12/2/2024 1152 by Alexander Brown RN  Outcome: Progressing  12/1/2024 2326 by Cierra Alexander RN  Outcome: Progressing     Problem: Skin/Tissue Integrity  Goal: Absence of new skin breakdown  Description: 1.  Monitor for areas of redness and/or skin breakdown  2.  Assess vascular access sites hourly  3.  Every 4-6 hours minimum:  Change oxygen saturation probe site  4.  Every 4-6 hours:  If on nasal continuous positive airway pressure, respiratory therapy assess nares and determine need for appliance change or resting period.  12/2/2024 1152 by Alexander Brown RN  Outcome: Progressing  12/1/2024 2326 by Cierra Alexander RN  Outcome: Progressing     Problem: Safety - Adult  Goal: Free from fall injury  12/2/2024 1152 by Alexander Brown RN  Outcome: Progressing  12/1/2024 2326 by Cierra Alexander RN  Outcome: Progressing     Problem: ABCDS Injury Assessment  Goal: Absence of physical injury  12/2/2024 1152 by Alexander Brown RN  Outcome: Progressing  12/1/2024 2326 by Cierra Alexander RN  Outcome: Progressing     Problem: Respiratory - Adult  Goal: Achieves 
  Problem: Discharge Planning  Goal: Discharge to home or other facility with appropriate resources  Outcome: [facility] Progressing     Problem: Pain  Goal: Verbalizes/displays adequate comfort level or baseline comfort level  Outcome: [facility] Progressing     Problem: Skin/Tissue Integrity  Goal: Absence of new skin breakdown  Description: 1.  Monitor for areas of redness and/or skin breakdown  2.  Assess vascular access sites hourly  3.  Every 4-6 hours minimum:  Change oxygen saturation probe site  4.  Every 4-6 hours:  If on nasal continuous positive airway pressure, respiratory therapy assess nares and determine need for appliance change or resting period.  Outcome: [facility] Progressing     Problem: Safety - Adult  Goal: Free from fall injury  Outcome: [facility] Progressing     Problem: ABCDS Injury Assessment  Goal: Absence of physical injury  Outcome: [facility] Progressing     Problem: Respiratory - Adult  Goal: Achieves optimal ventilation and oxygenation  Outcome: [facility] Progressing     Problem: Nutrition Deficit:  Goal: Optimize nutritional status  Outcome: [facility] Progressing     Problem: Skin/Tissue Integrity - Adult  Goal: Oral mucous membranes remain intact  Outcome: [facility] Progressing     
  Problem: Discharge Planning  Goal: Discharge to home or other facility with appropriate resources  Outcome: Progressing     Problem: Pain  Goal: Verbalizes/displays adequate comfort level or baseline comfort level  Outcome: Progressing     Problem: Skin/Tissue Integrity  Goal: Absence of new skin breakdown  Description: 1.  Monitor for areas of redness and/or skin breakdown  2.  Assess vascular access sites hourly  3.  Every 4-6 hours minimum:  Change oxygen saturation probe site  4.  Every 4-6 hours:  If on nasal continuous positive airway pressure, respiratory therapy assess nares and determine need for appliance change or resting period.  Outcome: Progressing     Problem: Safety - Adult  Goal: Free from fall injury  Outcome: Progressing     Problem: ABCDS Injury Assessment  Goal: Absence of physical injury  Outcome: Progressing     
  Problem: Discharge Planning  Goal: Discharge to home or other facility with appropriate resources  Outcome: Progressing     Problem: Pain  Goal: Verbalizes/displays adequate comfort level or baseline comfort level  Outcome: Progressing     Problem: Skin/Tissue Integrity  Goal: Absence of new skin breakdown  Description: 1.  Monitor for areas of redness and/or skin breakdown  2.  Assess vascular access sites hourly  3.  Every 4-6 hours minimum:  Change oxygen saturation probe site  4.  Every 4-6 hours:  If on nasal continuous positive airway pressure, respiratory therapy assess nares and determine need for appliance change or resting period.  Outcome: Progressing     Problem: Safety - Adult  Goal: Free from fall injury  Outcome: Progressing     Problem: ABCDS Injury Assessment  Goal: Absence of physical injury  Outcome: Progressing     Problem: Respiratory - Adult  Goal: Achieves optimal ventilation and oxygenation  11/21/2024 0727 by Lina Batista RN  Outcome: Progressing  11/20/2024 2144 by Dian Regan RCP  Outcome: Progressing     
  Problem: Discharge Planning  Goal: Discharge to home or other facility with appropriate resources  Outcome: Progressing     Problem: Pain  Goal: Verbalizes/displays adequate comfort level or baseline comfort level  Outcome: Progressing     Problem: Skin/Tissue Integrity  Goal: Absence of new skin breakdown  Description: 1.  Monitor for areas of redness and/or skin breakdown  2.  Assess vascular access sites hourly  3.  Every 4-6 hours minimum:  Change oxygen saturation probe site  4.  Every 4-6 hours:  If on nasal continuous positive airway pressure, respiratory therapy assess nares and determine need for appliance change or resting period.  Outcome: Progressing     Problem: Safety - Adult  Goal: Free from fall injury  Outcome: Progressing     Problem: ABCDS Injury Assessment  Goal: Absence of physical injury  Outcome: Progressing     Problem: Respiratory - Adult  Goal: Achieves optimal ventilation and oxygenation  11/22/2024 0739 by Lina Batista RN  Outcome: Progressing  11/22/2024 0658 by Andreia Walters, RT  Outcome: Progressing  11/22/2024 0252 by Analilia Guidry, LUPILLO  Outcome: Not Progressing     Problem: Nutrition Deficit:  Goal: Optimize nutritional status  Outcome: Progressing     Problem: Respiratory - Adult  Goal: Achieves optimal ventilation and oxygenation  11/22/2024 0739 by Lina Batista RN  Outcome: Progressing  11/22/2024 0658 by Andreia Walters, RT  Outcome: Progressing  11/22/2024 0252 by Analilia Guidry RCP  Outcome: Not Progressing     
  Problem: Discharge Planning  Goal: Discharge to home or other facility with appropriate resources  Outcome: Progressing     Problem: Pain  Goal: Verbalizes/displays adequate comfort level or baseline comfort level  Outcome: Progressing     Problem: Skin/Tissue Integrity  Goal: Absence of new skin breakdown  Description: 1.  Monitor for areas of redness and/or skin breakdown  2.  Assess vascular access sites hourly  3.  Every 4-6 hours minimum:  Change oxygen saturation probe site  4.  Every 4-6 hours:  If on nasal continuous positive airway pressure, respiratory therapy assess nares and determine need for appliance change or resting period.  Outcome: Progressing     Problem: Safety - Adult  Goal: Free from fall injury  Outcome: Progressing     Problem: ABCDS Injury Assessment  Goal: Absence of physical injury  Outcome: Progressing     Problem: Respiratory - Adult  Goal: Achieves optimal ventilation and oxygenation  11/23/2024 0446 by Portia Gross, RN  Outcome: Progressing  11/22/2024 1929 by Suzie Hickman, RCP  Outcome: Progressing     Problem: Nutrition Deficit:  Goal: Optimize nutritional status  Outcome: Progressing     
  Problem: Discharge Planning  Goal: Discharge to home or other facility with appropriate resources  Outcome: Progressing     Problem: Pain  Goal: Verbalizes/displays adequate comfort level or baseline comfort level  Outcome: Progressing     Problem: Skin/Tissue Integrity  Goal: Absence of new skin breakdown  Description: 1.  Monitor for areas of redness and/or skin breakdown  2.  Assess vascular access sites hourly  3.  Every 4-6 hours minimum:  Change oxygen saturation probe site  4.  Every 4-6 hours:  If on nasal continuous positive airway pressure, respiratory therapy assess nares and determine need for appliance change or resting period.  Outcome: Progressing     Problem: Safety - Adult  Goal: Free from fall injury  Outcome: Progressing     Problem: ABCDS Injury Assessment  Goal: Absence of physical injury  Outcome: Progressing     Problem: Respiratory - Adult  Goal: Achieves optimal ventilation and oxygenation  11/24/2024 0424 by Portia Gross RN  Outcome: Progressing  11/23/2024 1824 by Analilia Guidry RCP  Outcome: Progressing     Problem: Nutrition Deficit:  Goal: Optimize nutritional status  Outcome: Progressing     Problem: Physical Therapy - Adult  Goal: By Discharge: Performs mobility at highest level of function for planned discharge setting.  See evaluation for individualized goals.  Description: FUNCTIONAL STATUS PRIOR TO ADMISSION: Patient was independent and active without use of DME. Patient on 2L continuous O2 prior to admission.    HOME SUPPORT PRIOR TO ADMISSION: The patient lived alone with local children to provide assistance. Daughter was staying with 3-4 days prior to admission.    Physical Therapy Goals  Initiated 11/23/2024  1.  Patient will move from supine to sit and sit to supine and scoot up and down in bed with modified independence within 7 day(s).    2.  Patient will perform sit to stand with modified independence within 7 day(s).  3.  Patient will transfer from bed to 
  Problem: Discharge Planning  Goal: Discharge to home or other facility with appropriate resources  Outcome: Progressing     Problem: Pain  Goal: Verbalizes/displays adequate comfort level or baseline comfort level  Outcome: Progressing     Problem: Skin/Tissue Integrity  Goal: Absence of new skin breakdown  Description: 1.  Monitor for areas of redness and/or skin breakdown  2.  Assess vascular access sites hourly  3.  Every 4-6 hours minimum:  Change oxygen saturation probe site  4.  Every 4-6 hours:  If on nasal continuous positive airway pressure, respiratory therapy assess nares and determine need for appliance change or resting period.  Outcome: Progressing     Problem: Safety - Adult  Goal: Free from fall injury  Outcome: Progressing     Problem: ABCDS Injury Assessment  Goal: Absence of physical injury  Outcome: Progressing     Problem: Respiratory - Adult  Goal: Achieves optimal ventilation and oxygenation  11/25/2024 0626 by Portia Gross, RN  Outcome: Progressing  11/24/2024 1836 by Brianne Duran, RT  Outcome: Progressing     Problem: Nutrition Deficit:  Goal: Optimize nutritional status  Outcome: Progressing     
  Problem: Discharge Planning  Goal: Discharge to home or other facility with appropriate resources  Outcome: Progressing  Flowsheets (Taken 11/25/2024 1958)  Discharge to home or other facility with appropriate resources:   Identify barriers to discharge with patient and caregiver   Arrange for needed discharge resources and transportation as appropriate   Identify discharge learning needs (meds, wound care, etc)     Problem: Pain  Goal: Verbalizes/displays adequate comfort level or baseline comfort level  Outcome: Progressing  Flowsheets (Taken 11/25/2024 1958)  Verbalizes/displays adequate comfort level or baseline comfort level:   Encourage patient to monitor pain and request assistance   Assess pain using appropriate pain scale     Problem: Skin/Tissue Integrity  Goal: Absence of new skin breakdown  Description: 1.  Monitor for areas of redness and/or skin breakdown  2.  Assess vascular access sites hourly  3.  Every 4-6 hours minimum:  Change oxygen saturation probe site  4.  Every 4-6 hours:  If on nasal continuous positive airway pressure, respiratory therapy assess nares and determine need for appliance change or resting period.  Outcome: Progressing     Problem: Safety - Adult  Goal: Free from fall injury  Outcome: Progressing     Problem: ABCDS Injury Assessment  Goal: Absence of physical injury  Outcome: Progressing     Problem: Respiratory - Adult  Goal: Achieves optimal ventilation and oxygenation  Outcome: Progressing  Flowsheets (Taken 11/25/2024 1958)  Achieves optimal ventilation and oxygenation: Assess for changes in respiratory status     
  Problem: Discharge Planning  Goal: Discharge to home or other facility with appropriate resources  Outcome: Progressing  Flowsheets (Taken 11/30/2024 2016)  Discharge to home or other facility with appropriate resources:   Identify barriers to discharge with patient and caregiver   Identify discharge learning needs (meds, wound care, etc)   Refer to discharge planning if patient needs post-hospital services based on physician order or complex needs related to functional status, cognitive ability or social support system     Problem: Pain  Goal: Verbalizes/displays adequate comfort level or baseline comfort level  Outcome: Progressing     Problem: Skin/Tissue Integrity  Goal: Absence of new skin breakdown  Description: 1.  Monitor for areas of redness and/or skin breakdown  2.  Assess vascular access sites hourly  3.  Every 4-6 hours minimum:  Change oxygen saturation probe site  4.  Every 4-6 hours:  If on nasal continuous positive airway pressure, respiratory therapy assess nares and determine need for appliance change or resting period.  Outcome: Progressing     Problem: Safety - Adult  Goal: Free from fall injury  Outcome: Progressing     Problem: ABCDS Injury Assessment  Goal: Absence of physical injury  Outcome: Progressing     Problem: Respiratory - Adult  Goal: Achieves optimal ventilation and oxygenation  Outcome: Progressing  Flowsheets (Taken 11/30/2024 2016)  Achieves optimal ventilation and oxygenation: Assess for changes in respiratory status     Problem: Skin/Tissue Integrity - Adult  Goal: Oral mucous membranes remain intact  Outcome: Progressing     
  Problem: Discharge Planning  Goal: Discharge to home or other facility with appropriate resources  Outcome: Progressing  Flowsheets (Taken 12/1/2024 1938)  Discharge to home or other facility with appropriate resources:   Identify barriers to discharge with patient and caregiver   Identify discharge learning needs (meds, wound care, etc)   Refer to discharge planning if patient needs post-hospital services based on physician order or complex needs related to functional status, cognitive ability or social support system     Problem: Pain  Goal: Verbalizes/displays adequate comfort level or baseline comfort level  Outcome: Progressing     Problem: Skin/Tissue Integrity  Goal: Absence of new skin breakdown  Description: 1.  Monitor for areas of redness and/or skin breakdown  2.  Assess vascular access sites hourly  3.  Every 4-6 hours minimum:  Change oxygen saturation probe site  4.  Every 4-6 hours:  If on nasal continuous positive airway pressure, respiratory therapy assess nares and determine need for appliance change or resting period.  Outcome: Progressing     Problem: Safety - Adult  Goal: Free from fall injury  Outcome: Progressing     Problem: ABCDS Injury Assessment  Goal: Absence of physical injury  Outcome: Progressing     Problem: Respiratory - Adult  Goal: Achieves optimal ventilation and oxygenation  Outcome: Progressing  Flowsheets (Taken 12/1/2024 1938)  Achieves optimal ventilation and oxygenation: Assess for changes in respiratory status     Problem: Nutrition Deficit:  Goal: Optimize nutritional status  Outcome: Progressing     Problem: Skin/Tissue Integrity - Adult  Goal: Oral mucous membranes remain intact  Outcome: Progressing  Flowsheets (Taken 12/1/2024 1938)  Oral Mucous Membranes Remain Intact: Assess oral mucosa and hygiene practices     
  Problem: Discharge Planning  Goal: Discharge to home or other facility with appropriate resources  Outcome: Progressing  Flowsheets (Taken 12/2/2024 0745 by Alexander Brown, RN)  Discharge to home or other facility with appropriate resources: Identify barriers to discharge with patient and caregiver     Problem: Pain  Goal: Verbalizes/displays adequate comfort level or baseline comfort level  Outcome: Progressing  Flowsheets (Taken 11/26/2024 0354 by Andres Stevens, RN)  Verbalizes/displays adequate comfort level or baseline comfort level:   Encourage patient to monitor pain and request assistance   Assess pain using appropriate pain scale   Administer analgesics based on type and severity of pain and evaluate response     Problem: Skin/Tissue Integrity  Goal: Absence of new skin breakdown  Description: 1.  Monitor for areas of redness and/or skin breakdown  2.  Assess vascular access sites hourly  3.  Every 4-6 hours minimum:  Change oxygen saturation probe site  4.  Every 4-6 hours:  If on nasal continuous positive airway pressure, respiratory therapy assess nares and determine need for appliance change or resting period.  Outcome: Progressing     
  Problem: Occupational Therapy - Adult  Goal: By Discharge: Performs self-care activities at highest level of function for planned discharge setting.  See evaluation for individualized goals.  Description: FUNCTIONAL STATUS PRIOR TO ADMISSION:  Patient is independent for self care and functional transfers/mobility at baseline (daughter recently came to stay with patient 3-4 days prior to admission as pt having increased difficulty with all tasks 2/2 SOB). Patient on 2L O2 via NC at baseline.     HOME SUPPORT: Patient lived alone with supportive daughter and son.    Occupational Therapy Goals:  Initiated 11/21/2024  1.  Patient will perform grooming with Modified [place] within 7 day(s).  2.  Patient will perform bathing with Modified [place] within 7 day(s).  3.  Patient will perform lower body dressing with Modified [place] within 7 day(s).  4.  Patient will perform toilet transfers with Modified [place]  within 7 day(s).  5.  Patient will perform all aspects of toileting with Modified [place] within 7 day(s).  6.  Patient will participate in upper extremity therapeutic exercise/activities with Modified [place] for 10 minutes within 7 day(s).    7.  Patient will utilize energy conservation techniques during functional activities with verbal cues within 7 day(s).    Outcome: Progressing     OCCUPATIONAL THERAPY EVALUATION    Patient: Davis Dixon (79 y.o. male)  Date: 11/21/2024  Primary Diagnosis: Pleural effusion [J90]  Elevated troponin [R79.89]  Acute on chronic respiratory failure with hypoxia [J96.21]  Acute hypoxemic respiratory failure [J96.01]  Acute on chronic congestive heart failure, unspecified heart failure type (HCC) [I50.9]         Precautions:                    ASSESSMENT :  Patient received semi supine in bed A&Ox4 and agreeable for OT eval/tx (pt s/p thoracentesis 11/21/2024 with 1400 cc removed). Per pt report, pt lives alone (daughter came to stay with pt 3-4 
  Problem: Physical Therapy - Adult  Goal: By Discharge: Performs mobility at highest level of function for planned discharge setting.  See evaluation for individualized goals.  Description: FUNCTIONAL STATUS PRIOR TO ADMISSION: Patient was independent and active without use of DME. Patient on 2L continuous O2 prior to admission.    HOME SUPPORT PRIOR TO ADMISSION: The patient lived alone with local children to provide assistance. Daughter was staying with 3-4 days prior to admission.    Physical Therapy Goals  Initiated 11/23/2024  1.  Patient will move from supine to sit and sit to supine and scoot up and down in bed with modified independence within 7 day(s).    2.  Patient will perform sit to stand with modified independence within 7 day(s).  3.  Patient will transfer from bed to chair and chair to bed with modified independence using the least restrictive device within 7 day(s).  4.  Patient will ambulate with independence for 75 feet with the least restrictive device within 7 day(s).   5.  Patient will ascend/descend 4 stairs with 1 handrail(s) with modified independence within 7 day(s).      11/26/2024 1337 by Bowen Putnam, PT  Outcome: Progressing   PHYSICAL THERAPY TREATMENT    Patient: Davis Dixon (79 y.o. male)  Date: 11/26/2024  Diagnosis: Pleural effusion [J90]  Elevated troponin [R79.89]  Acute on chronic respiratory failure with hypoxia [J96.21]  Acute hypoxemic respiratory failure [J96.01]  Acute on chronic congestive heart failure, unspecified heart failure type (HCC) [I50.9] Acute hypoxemic respiratory failure      Precautions:                        ASSESSMENT:  Patient continues to benefit from skilled PT services and is slowly progressing towards goals. Received on 7L O2 via midflow NC and SpO2 96% at rest upon arrival. He continues to desaturate quickly with activity and requires time to recover. Noted tendency towards mouth breathing  but he responded well to cues for PLB this date. 
Speech LAnguage Pathology EVALUATION    Patient: Davis Dixon (79 y.o. male)  Date: 11/26/2024  Primary Diagnosis: Pleural effusion [J90]  Elevated troponin [R79.89]  Acute on chronic respiratory failure with hypoxia [J96.21]  Acute hypoxemic respiratory failure [J96.01]  Acute on chronic congestive heart failure, unspecified heart failure type (HCC) [I50.9]       Precautions: aspiration                     ASSESSMENT :  Patient may be aspirating. He has a long standing h/o esophageal issues and with additional weight loss and weakness, he may have pharyngeal dysphagia as well.  He understands this, but would like to continue to eat while he is waiting for cytology results. He deferred offers for FEES or MBS. Granddaughter agreed that he understood his aspiration risks.  SLP spoke with MD as well.   Admitted 11-20-24 with acute on chr resp failure, weight loss. Chest ct:  moderate R pl effusion with basilar collapse   11-21:  R thoracentesis drained 1400 CC of fluids  11-22: drain for pl ef  11-23: CTA: LLL PE  and L Pl eff  11-26:  c/o dysphagia.     PMH: COPD 2l 02, AAA, TIA, primary lung CA, weight loss, h/o esophageal dilatation.     Patient will benefit from skilled intervention to address the above impairments.     PLAN :  Recommendations and Planned Interventions:  Diet: Easy to chew and thin liquids  Supplement per RD         Acute SLP Services:  . Patient's rehabilitation potential is considered to be Guarded.  Discharge Recommendations: Continue to assess pending progress     SUBJECTIVE:   Patient stated, “I understand. I just want to get these results to see if there's something they can do to fix it.”    OBJECTIVE:     Past Medical History:   Diagnosis Date    AAA (abdominal aortic aneurysm) ([facility])     stenting 2019 by Dr. Collazo    COPD (chronic obstructive pulmonary disease) ([facility])     History of lung surgery     Lung mass     TIA (transient ischemic attack)      Past Surgical History:   Procedure 
breathing  Education Method: Verbal  Barriers to Learning: None  Education Outcome: Verbalized understanding      Bowen Putnam, PT  Minutes: 12   
A, Vidhi S, Aubree W, [place] VYAS. AM-PAC Short Forms Manual 4.0. Revised 2/2020.                                                                                                                                                                                                                                  Activity Tolerance:   Fair  and limited by labile SpO2    After treatment:   Patient left in no apparent distress in bed and Call bell within reach; nursing at bedside for med admin    COMMUNICATION/EDUCATION:   The patient's plan of care was discussed with: registered nurse    Patient Education  Education Given To: Patient  Education Provided: Role of Therapy;Home Exercise Program;Plan of Care;Transfer Training;Fall Prevention Strategies  Education Provided Comments: pursed lip breathing  Education Method: Verbal  Barriers to Learning: None  Education Outcome: Verbalized understanding    Thank you for this referral.  Bowen Putnam, PT  Minutes: 32      Physical Therapy Evaluation Charge Determination   History Examination Presentation Decision-Making   MEDIUM  Complexity : 1-2 comorbidities / personal factors will impact the outcome/ POC  MEDIUM Complexity : 3 Standardized tests and measures addressin body structure, function, activity limitation and / or participation in recreation  MEDIUM Complexity : Evolving with changing characteristics  AM-PAC  LOW    Based on the above components, the patient evaluation is determined to be of the following complexity level: Low

## 2024-12-03 NOTE — PROGRESS NOTES
Spiritual Health History and Assessment/Progress Note  [facility]    Initial Encounter,  , Adjustment to illness,      Name: Davis Dixon MRN: [id number]    Age: 79 y.o.     Sex: male   Language: English   Muslim: Christianity   Acute hypoxemic respiratory failure     Date: 11/23/2024            Total Time Calculated: 14 min              Spiritual Assessment began in [facility] B3 INTERMEDIATE CARE UNIT        Referral/Consult From: Palliative Care   Encounter Overview/Reason: Initial Encounter  Service Provided For: Patient and family together    Lilia, Belief, Meaning:   Patient identifies as spiritual, is connected with a lilia tradition or spiritual practice, and Other: Mr Dixon shared that he was Nondenominational  Family/Friends identify as spiritual      Importance and Influence:  Patient has spiritual/personal beliefs that influence decisions regarding their health  Family/Friends have spiritual/personal beliefs that influence decisions regarding the patient's health    Community:  Patient feels well-supported. Support system includes: Children  Family/Friends feel well-supported. Support system includes: Extended family    Assessment and Plan of Care:     Patient Interventions include: Facilitated expression of thoughts and feelings, Explored spiritual coping/struggle/distress, and Other: Provided spiritual presence and active listening as patient shared a little about his current hospitalization. Acknowledged his feelings and concerns and offered words of support. Patient shared that he was Nondenominational and requested that  have prayer on his behalf. Had prayer as requested and assured patient and family of ongoing  availability for support.  Family/Friends Interventions include: Facilitated expression of thoughts and feelings and Other: One of patient's sons was at his bedside. He appeared in good spirits and denied having any concerns to share at that time.    Patient Plan of Care: Spiritual 
   11/21/24 0729   Spirometry Assessment   COPD Exacerbations in last year 0   COPD Assessment (CAT Score)   $RT COPD Assessment Yes   GOLD Staging   Group Group B     Pt adm 11/20 with O2 sat=68% on RA.  Placed on NRM in ER, now weaned to 4L NC.  Pt wears 2L NC at home baseline.  Pt quit smoking 1 month ago.  Pt with Pulm consult and sees Dr. Bell with PAR.  Will schedule follow up appt and assess for additional O2 needs at discharge.  Will place consult for [facility] home service.  
  [facility] [place] [facility]  [address], [address] [facility] [address]  W: [phone]  F: [phone]      Reason for Visit:   Davis Dixon is a 79 y.o. male who is seen today for evaluation of DVT, PE, and malignant pleural effusion.    Hematology/Oncology Treatment History:   Squamous Cell Carcinoma of the Lung  RUL Wedge Resection and mediastinal lymph node dissection with Dr. Puckett 12/29/2027: squamous cell carcinoma, 3.5cm  Stage IB (pT2a pN0 M0) Non-small cell lung cancer, Right  No adjuvant therapy  Patient lost to surveillance follow up after one year    Adenocarcinoma of the Lung  US Thoracentesis 11/21/2024: Metastatic adenocarcinoma, consistent with lung primary.   CTA Chest 11/24/2024: Left lower lobe pulmonary embolism. Moderate right pleural effusion with chest tube in position. Improved right pleural effusion. Small left pleural effusion is new. 4.2 cm infrarenal abdominal aortic aneurysm with endograft in position and possible endoleak.  CTA A/P w/wo contrast 11/25/2024: Status post endovascular stent graft repair of abdominal aortic aneurysm. There is high density material in the proximal excluded aneurysm sac which which was present on prior studies and may be related to postsurgical changes rather than leak. No definite leak is identified. Right basilar hydropneumothorax status post chest tube placement.  Stage RAUL (cTx cNx M1a) Non-small cell lung cancer      Interval History:   He declined swallow eval yesterday. He declined to work with PT yesterday. He remains on 5L O2. He reports anxiety this morning. He is accompanied by multiple family members at the bedside. He tells me that he has been speaking to them about hospice.        Review of systems was obtained and pertinent findings reviewed above. Past medical history, social history, family history, medications, and allergies are located in the electronic medical record.    Physical Exam:   Visit 
  [facility] [place] [facility]  [address], [address] [facility] [address]  W: [phone]  F: [phone]      Reason for Visit:   Davis Dixon is a 79 y.o. male who is seen today for evaluation of DVT, PE, pleural effusion.    Hematology/Oncology Treatment History:   Note from Dr. Puckett reviewed, last seen 1/17/2019. Patient had a RUL wedge resection, LLL wedge resection, and mediastinal lymphadenectomy 12/29/2017. Pathology revealed a 3.5cm invasive squamous cell carcinoma in the RUL, with LLL negative for malignancy. Stage IB (pT2a pN0 M0). No adjuvant therapy was given. He was followed with surveillance through 1/2019, after which he was lost to follow up.      Interval History:   He reports persistent dyspnea. Chest tube has been clamped but remains in place.      Review of systems was obtained and pertinent findings reviewed above. Past medical history, social history, family history, medications, and allergies are located in the electronic medical record.    Physical Exam:   Visit Vitals  BP (!) 150/69   Pulse 87   Temp 97.4 °F (36.3 °C) (Oral)   Resp 16   Ht 1.702 m (5' 7\")   Wt 57.7 kg (127 lb 4.8 oz)   SpO2 93%   BMI 19.94 kg/m²     ECOG PS: 2-3  General:chronically ill appearing ; no acute distress  Eyes: anicteric sclerae  HENT: oropharynx clear  Neck: supple  Lymphatic: no cervical, supraclavicular, or inguinal adenopathy  Respiratory: normal respiratory effort; Chest tube noted: O2 in use   CV: 1+ BLE edema noted   GI: soft, nontender, nondistended, no masses  Skin: no rashes; no ecchymoses; no petechiae    Results:     Lab Results   Component Value Date    WBC 8.0 11/27/2024    HGB 10.6 (L) 11/27/2024    HCT 32.6 (L) 11/27/2024     11/27/2024    MCV 97.6 11/27/2024    NEUTROABS 7.2 11/27/2024     Lab Results   Component Value Date     11/27/2024    K 4.4 11/27/2024     11/27/2024    CO2 32 11/27/2024    GLUCOSE 155 (H) 11/27/2024    BUN 18 
  Physician Progress Note      PATIENT:               JAMISON FALCON  CSN #:                  [id number]  :                       1945  ADMIT DATE:       2024 9:51 AM  DISCH DATE:  RESPONDING  PROVIDER #:        Chantel Schaffer MD          QUERY TEXT:    Pt admitted with respiratory failure and has malnutrition documented in  note. Please further specify type of malnutrition with documentation in the   medical record.    The medical record reflects the following:  Risk Factors: 79-year-old male with a history of COPD, AAA, TIA comes to the   hospital with acute on chronic hypoxemic respiratory failure due to right   pleural effusion.  Patient admitted for further workup and pulmonology   evaluation.  Clinical Indicators: Malnutrition Status:  Severe malnutrition (24 221)  Context:  Chronic Illness  Findings of the 6 clinical characteristics of malnutrition:  Energy Intake:  Mild decrease in energy intake  Weight Loss:  Mild weight loss  Body Fat Loss:  Severe body fat loss Fat Overlying Ribs, Triceps, Orbital,   Buccal region  Muscle Mass Loss:  Severe muscle mass loss Clavicles (pectoralis & deltoids),   Temples (temporalis), Calf (gastrocnemius), Scapula (trapezius), Hand   (interosseous), Thigh (quadriceps)  Fluid Accumulation:  Mild Extremities  Treatment: RD following    Thank you,  Tamara Díaz RN, CDI  [email or web address]  >  Options provided:  -- Severe Malnutrition  -- Other - I will add my own diagnosis  -- Disagree - Not applicable / Not valid  -- Disagree - Clinically unable to determine / Unknown  -- Refer to Clinical Documentation Reviewer    PROVIDER RESPONSE TEXT:    This patient has severe malnutrition.    Query created by: Tamara Díaz on 2024 3:38 PM      Electronically signed by:  Chantel Schaffer MD 2024 3:44 PM          
0700 Bedside and Verbal shift change report given to DEVIN Mills (oncoming nurse) by DEVIN Hickey (offgoing nurse). Report included the following information Nurse Handoff Report, Recent Results, Med Rec Status, and Cardiac Rhythm NSR .     
0700: Bedside and Verbal shift change report given to Alexander RN (oncoming nurse) by Cierra RN (offgoing nurse). Report included the following information Nurse Handoff Report, Adult Overview, Intake/Output, MAR, Recent Results, and Cardiac Rhythm NSR .     1900: Bedside and Verbal shift change report given to Job RN (oncoming nurse) by Alexander RN (offgoing nurse). Report included the following information Nurse Handoff Report, Adult Overview, Intake/Output, MAR, Recent Results, and Cardiac Rhythm NSR .     
0810  Arrived to ultrasound for ultrasound right sided thoracentesis. Identified with two identifiers. Patient connected to dynamap and wall oxygen at 7 liters NC.  0820  PHIL Smith at bedside for consent with reviewed of risks and benefits and consent obtained. Time out 0823 Start t time 0823 End time 0833   . Patient prepped and draped sterilely and right chest accessed with hazy ike fluid removed,1400 ml.  Patient tolerated fair. CDI band-aide to right lower posterior chest. Perfect serve message sent to Lina Batista RN received.   
11/23/24 at 1211 sent a message to zayda that the patient has Zosyn running now and has doxy-100 ordered they are not compatiable he has heparin in his other IV the son stated that the doxy was held and started when the zosyn was completed i advised i would have to check with you before i did that  At 1333 Dr Schaffer stated that we can stagger the medications   At 1528 talked with Pamela in the pharmacy about the heparin she advised that we would not change the dosage of the heparin due to the problems with the IV and getting a new one started, we will get a new anti-xa at 2000 and redose from there.  Dr Schaffer is aware of the above the pharmacy talked with her  
1330 Patient oxygen 82% on monitor on 10L midflow. Titrated oxygen to 15L midflow and placed non-rebreather on patient and messaged RT. Primary nurse at bedside and MD and oncology MD speaking with patient. Notified patient's daughter on phone. RT at bedside setting up patient to HFNC.  1345 Palliative team at bedside with patient's family.   
1700  Up at patient's bedside for for ultrasound guide right chest pigtail drain placement connected to hemovac.   1710 Provider at bedside for consent with questions asked and answered and consent obtained.   1715  Patient positioned right side up. Time out 1718 Start time 1719 End time 1737 . Patient tolerated well right chested posterior 8 FR pigtail drain placed and connected to Hemovac, to water seal, 50 cc clear yellow fluid removed with procedure.   1740  Bedside report given to DEVIN Mills with questions asked and answered. Chest x ray order placed.      
1900 Bedside and Verbal shift change report given to DEVIN Hickey (oncoming nurse) by DEVIN Mills (offgoing nurse). Report included the following information Nurse Handoff Report, Recent Results, Med Rec Status, and Cardiac Rhythm NSR .     
1900 Bedside and Verbal shift change report given to DEVIN Ramirez (oncoming nurse) by DEVIN Mills (offgoing nurse). Report included the following information Nurse Handoff Report, Recent Results, Med Rec Status, and Alarm Parameters.     
1918  Patient complaining of feeling short of breath and like he can not get comfortable.  States it has been like that all day.  Chest tube was clamped today.  Discussed with Samira JARAMILLO.      4573  Patient reports improvement with breathing thought out the night.      0700  Bedside and Verbal shift change report given to Juarez (oncoming nurse) by Tiera (offgoing nurse). Report included the following information Nurse Handoff Report.     
2012  Patient requesting something to help with his cough.  Discussed with Nona JARAMILLO.      0700  Bedside and Verbal shift change report given to Opal (oncoming nurse) by Tiera (offgoing nurse). Report included the following information Nurse Handoff Report.     
Bedside and Verbal shift change report given to Staci (oncoming nurse) by Tiera (offgoing nurse). Report included the following information Nurse Handoff Report.     
CT scan reviewed  No urgent process  Needs follow up with regular surgeon post admission.   
Comprehensive Nutrition Assessment    Type and Reason for Visit: Positive nutrition screen    Nutrition Recommendations/Plan:   Provide Ensure Plus High Protein BID to increase kcal/protein intake (700 kcal, 88 g Carbs, 40 g protein)  Obtain Standing weight as able  Encourage intake of supplement and small frequent meals       Malnutrition Assessment:  Malnutrition Status:  Severe malnutrition (24 6367)    Context:  Chronic Illness     Findings of the 6 clinical characteristics of malnutrition:  Energy Intake:  Mild decrease in energy intake  Weight Loss:  Mild weight loss     Body Fat Loss:  Severe body fat loss Fat Overlying Ribs, Triceps, Orbital, Buccal region   Muscle Mass Loss:  Severe muscle mass loss Clavicles (pectoralis & deltoids), Temples (temporalis), Calf (gastrocnemius), Scapula (trapezius), Hand (interosseous), Thigh (quadriceps)  Fluid Accumulation:  Mild Extremities   Strength:  Not Performed     Nutrition Assessment:    79 year old Male admitted with Pleural effusion [J90]  Elevated troponin [R79.89]  Acute on chronic respiratory failure with hypoxia [J96.21]  Acute hypoxemic respiratory failure [J96.01]  Acute on chronic congestive heart failure, unspecified heart failure type (HCC) [I50.9] who has a past medical history of AAA (abdominal aortic aneurysm) (HCC), COPD (chronic obstructive pulmonary disease) (HCC), History of lung surgery, Lung mass, and TIA (transient ischemic attack).     Pt had thora removing 1400cc of fluid. Obtained bed scale weight which is down from UBW reported. Pt reports he hasn't been eating well since his wife  (in ). Nutrition focused physical exam completed by RD with results of severe protein-calorie malnutrition that appears to be chronic in nature. Pt has very little muscle mass and poor fat stores. He will accept nutritional supplements and reports he sometimes has these at home. He hasn't eaten much today, stating he wants to ease back into 
[facility] [place] [facility]  [phone]    Cytology has returned positive for adenocarcinoma with IHC stains consistent with lung primary. His prior lung cancer from 2017 was squamous cell, so this appears to be a new process. A primary lesion is not evident on his recent CT. I recommend further staging with MRI brain (inpatient if possible) and PET/CT (outpatient). I will also order PDL1 and FoundationOne on his cytology specimen. We will arrange for outpatient follow up after the above to discuss treatment options.    I am currently in the office seeing patients. I will ask ELLA Cardoza to see the patient in the hospital and share the above information.  
Echo attempted, patient ruben  
Follow up with Pulmonary set for Monday January 27th 2024 at 8:30 AM with Doctor Bell are their [facility] location in [place].   
Heme/ Onc    Reviewed cytology results with pt and pt's daughter, Jennifer ( via phone per pt request ). Reviewed this is a stage IV metastatic adenocarcinoma , consistent with lung primary  but there is no obvious primary seen on imaging and that this is a different type of cancer  than his previous lung cancers. By being stage IV it is not curable but may be treatable if pt would want tx. Tx would more than likely be with systemic therapy : either immunotherapy or combo immunotherapy/ chemo combo.   Pt stated he would not make a decision until tomorrow. All questions answered at this time.   Dr Izquierdo present.     Once pt is more stable from respiratory standpoint and if pt desires to pursue tx; recommend  completing staging with  MRI Brain inpatient and will obtain PET scan outpt.  We will proceed with ordering PDL1 and Foundation One testing on his cytology.     We will follow up with pt on Monday.     Plan reviewed with Dr Trejo       Signed By: Ann Schoeneweis, APRN - NP   11/27/24 at 2:29 PM EST      
[facility]  [address] [place] [address] [phone]        Hospitalist Progress Note      NAME: Davis Dixon   :  1945  MRM:  [id number]    Date/Time: 2024  1:12 PM         Subjective:     Chief Complaint: \"I have pain\"     Pt seen and examined. Pigtail pleural drain in place on R. Drained 850cc to date. Pt c/o pain. Asked RN to clamp and will administer pain meds for now     ROS:  (bold if positive, if negative)    Dyspnea   Pain       Objective:       Vitals:          Last 24hrs VS reviewed since prior progress note. Most recent are:    Vitals:    24 1130   BP: 108/63   Pulse:    Resp: 22   Temp: 98 °F (36.7 °C)   SpO2:      SpO2 Readings from Last 6 Encounters:   24 96%   23 96%          Intake/Output Summary (Last 24 hours) at 2024 1312  Last data filed at 2024 0723  Gross per 24 hour   Intake 516.71 ml   Output 450 ml   Net 66.71 ml          Exam:     Physical Exam:    Gen: frail, cachetic. Improved WOB   HEENT:  Pink conjunctivae, PERRL, hearing intact to voice, moist mucous membranes  Neck:  Supple, without masses, thyroid non-tender  Resp: prolonged expiratory phase with scattered wheezing  Card: RRR   Abd:  Soft, non-tender, non-distended, normoactive bowel sounds are present  Musc:  No cyanosis or clubbing  Skin:  No rashes or ulcers, skin turgor is good  Neuro:  Cranial nerves 3-12 are grossly intact,  strength is 5/5 bilaterally and dorsi / plantarflexion is 5/5 bilaterally, follows commands appropriately  Psych:  Good insight, oriented to person, place and time, alert  Bilateral LE edema; R>L       Medications Reviewed: (see below)    Lab Data Reviewed: (see below)    ______________________________________________________________________    Medications:     Current Facility-Administered Medications   Medication Dose Route Frequency    furosemide (LASIX) tablet 20 mg  20 mg Oral BID    ipratropium (ATROVENT) 
[facility]  [address] [place] [address] [phone]        Hospitalist Progress Note      NAME: Davis Dixon   :  1945  MRM:  [id number]    Date/Time: 2024  2:12 PM         Subjective:     Chief Complaint: \"I\"m okay\"     Pt seen and examined. Having some pain after thora. 1400cc fluid removed     ROS:  (bold if positive, if negative)    Dyspnea   Pain       Objective:       Vitals:          Last 24hrs VS reviewed since prior progress note. Most recent are:    Vitals:    24 1212   BP:    Pulse: 97   Resp: 20   Temp:    SpO2: 96%     SpO2 Readings from Last 6 Encounters:   24 96%   23 96%          Intake/Output Summary (Last 24 hours) at 2024 1412  Last data filed at 2024  Gross per 24 hour   Intake 120 ml   Output 300 ml   Net -180 ml          Exam:     Physical Exam:    Gen: frail, cachetic   HEENT:  Pink conjunctivae, PERRL, hearing intact to voice, moist mucous membranes  Neck:  Supple, without masses, thyroid non-tender  Resp: prolonged expiratory phase. No wheezing   Card:  No murmurs, normal S1, S2 without thrills, bruits    Abd:  Soft, non-tender, non-distended, normoactive bowel sounds are present  Musc:  No cyanosis or clubbing  Skin:  No rashes or ulcers, skin turgor is good  Neuro:  Cranial nerves 3-12 are grossly intact,  strength is 5/5 bilaterally and dorsi / plantarflexion is 5/5 bilaterally, follows commands appropriately  Psych:  Good insight, oriented to person, place and time, alert  Bilateral LE edema        Medications Reviewed: (see below)    Lab Data Reviewed: (see below)    ______________________________________________________________________    Medications:     Current Facility-Administered Medications   Medication Dose Route Frequency    LORazepam (ATIVAN) tablet 0.5 mg  0.5 mg Oral TID PRN    budesonide (PULMICORT) nebulizer suspension 500 mcg  0.5 mg Nebulization BID RT    arformoterol 
[facility]  [address] [place], [place] [address] [phone]      Medical Progress Note      NAME: Davis Dixon   :  1945  MRM:  [id number]    Date/Time: 2024  10:54 AM           Assessment / Plan:   Patient is a 79-year-old male with a history of COPD, AAA, TIA comes to the hospital with acute on chronic hypoxemic respiratory failure due to right pleural effusion. Patient admitted for further workup and pulmonology evaluation.     Acute on chronic hypoxic respiratory failure  Right pleural effusion with cytology concerning for new adenocarcinoma  History of 2 prior non-adenocarcinoma pulmonary cancers   Stage IV metastatic adenocarcinoma  Left lower lobe pulmonary embolism  S/P thoracentesis on  with 1400 cc of hazy ike fluid removed.    S/P pigtail placement, with approximately 1100 cc, exudative, cytology S/F adenocarcinoma  Respiratory event/decompensation likely due to aspiration  Patient refusing speech therapy evaluation for swallow study - considering  CTA Chest: LLL PE, mod pleural effusion, infrarenal aortic aneurysm with endograft in position and possible endoleak  Vascular has cleared patient for heparin - transition to apixaban   Prednisone 30mg daiy to 20mg   Reviewed GOC with him today and he wants to think about it; however, given his refusal of most recommendations, therapies, etc and my conversation with his son in law and pt; I think he is is more interested in Hospice than treatment; however, he is not yet made this decision himself. He has refused MRI.     Elevated BNP  Elevated BNP and worsening hypoxia may be related to CHF given significant respiratory compromise as above.  Echo:  preserved EF, and normal diastolic function.  Cautious diuresis  Strict I's and O's.  Reduce IV fluids as much as possible;  DECLINING lasix    Left lower lobe pulmonary embolism, likely acute  Acute DVT of the right soleal vein, POA  Acute DVT of 
[facility]  [address] [place], [place] [id number] [phone]      Medical Progress Note      NAME: Davis Dixon   :  1945  MRM:  [id number]    Date/Time: 2024  1:16 PM           Assessment / Plan:   Patient is a 79-year-old male with a history of COPD, AAA, TIA comes to the hospital with acute on chronic hypoxemic respiratory failure due to right pleural effusion. Patient admitted for further workup and pulmonology evaluation.     Discussed with oncology, pulmonary, respiratory, and nursing.  Cytology/pathology is significant for adenocarcinoma, unknown primary.  Stage IV, with no possibility of cure.  Oncology discussed this with me and with patient at bedside.    Remains on but weaning high flow. Likely intermittent aspiration but patient has been declining speech swallow eval. He is considering     Acute on chronic hypoxic respiratory failure  Right pleural effusion with cytology concerning for new adenocarcinoma  History of 2 prior non-adenocarcinoma pulmonary cancers   Stage IV metastatic adenocarcinoma  Left lower lobe pulmonary embolism  S/P thoracentesis on  with 1400 cc of hazy ike fluid removed.    Chest x-ray post thoracentesis with reduced fluid and no obvious pneumothorax.  S/P pigtail placement, with approximately 1100 cc, exudative, cytology S/F adenocarcinoma  Had slight worsening of pneumothorax today, follow-up x-ray in morning  Currently on high flow at 15L  Respiratory event/decompensation likely due to aspiration  Patient refusing modified diet or speech therapy evaluation for swallow study  Patient considering his long-term options including whether or not he wants aggressive care or workup  CTA Chest: LLL PE, mod pleural effusion, infrarenal aortic aneurysm with endograft in position and possible endoleak.  -No active leak will repeat CT  Vascular has cleared patient for heparin  Continuing heparin, will transition to Eliquis prior 
KRISTINA BOLES [facility]  [address] [place], [place] [id number] [phone]      Medical Progress Note      NAME: Davis Dixon   :  1945  MRM:  [id number]    Date/Time: 2024  5:01 PM           Assessment / Plan:   Patient is a 79-year-old male with a history of COPD, AAA, TIA comes to the hospital with acute on chronic hypoxemic respiratory failure due to right pleural effusion. Patient admitted for further workup and pulmonology evaluation.     CTA chest yesterday demonstrated a left lower lobe PE, pleural effusion, partially collapsed lung, and a possible endoleak.  Vascular was consulted, state they will follow-up today.  Vascular has cleared patient to start heparin.  Imaging has been ordered.  No inpatient interventions    Acute on chronic hypoxic respiratory failure  Right pleural effusion with concerns for malignancy  History of 2 pulmonary cancers  Weight loss  S/P thoracentesis on  with 1400 cc of hazy ike fluid removed.    Chest x-ray post thoracentesis with reduced fluid and no obvious pneumothorax.  S/P pigtail placement, with approximately 1100 cc of pleural fluid extracted.  Microbiology of pleural fluid reviewed; appears exudative;   Awaiting microbiology and cytology of pleural fluid.  CTA Chest: LLL PE, mod pleural effusion, infrarenal aortic aneurysm with endograft in position and possible endoleak.    Vascular has cleared patient for heparin  Further imaging ordered -no inpatient procedures planned  He is still requiring significant supplemental oxygen currently 7 L from his baseline of 2.  Will wean off as tolerated.  Continue cautious diuretics and antibiotics in the form of Zosyn.    Elevated BNP  Elevated BNP and worsening hypoxia may be related to CHF given significant respiratory compromise as above.  Echo:  preserved EF, and normal diastolic function.  Cautious diuresis  Strict I's and O's.  Reduce IV fluids as much as possible; I have switched 
KRISTINA BOLES [facility][note 3]  [address] [place], [place] [id number] [phone]      Medical Progress Note      NAME: Davis Dixon   :  1945  MRM:  [id number]    Date/Time: 2024  9:53 AM           Assessment / Plan:   Patient is a 79-year-old male with a history of COPD, AAA, TIA comes to the hospital with acute on chronic hypoxemic respiratory failure due to right pleural effusion. Patient admitted for further workup and pulmonology evaluation.     Discussed with oncology, pulmonary, respiratory, and nursing.  Cytology/pathology is significant for adenocarcinoma, unknown primary.  Stage IV, with no possibility of cure.  Oncology discussed this with patient at bedside.    Likely component of intermittent aspiration but patient has been declining speech swallow eval. He is considering full speech eval, and whether he wants to do aggressive workup and treatment for adenocarcinoma versus hospice.  At this time he is working on improving his respiratory status and then we will decide how aggressive he wants to workup the adenocarcinoma.      Acute on chronic hypoxic respiratory failure  Right pleural effusion with cytology concerning for new adenocarcinoma  History of 2 prior non-adenocarcinoma pulmonary cancers   Stage IV metastatic adenocarcinoma  Left lower lobe pulmonary embolism  S/P thoracentesis on  with 1400 cc of hazy ike fluid removed.    Chest x-ray post thoracentesis with reduced fluid and no obvious pneumothorax.  S/P pigtail placement, with approximately 1100 cc, exudative, cytology S/F adenocarcinoma  Currently on high flow satting 100%; hopeful to transition to NC today  Respiratory event/decompensation likely due to aspiration  Patient refusing speech therapy evaluation for swallow study - considering  CTA Chest: LLL PE, mod pleural effusion, infrarenal aortic aneurysm with endograft in position and possible endoleak  Vascular has cleared patient for heparin - 
Notified Dr. Izquierdo of chest xray results. MD states they will let pulmonology know.   
Occupational Therapy  11/25/24     Chart reviewed and patient cleared by RN to be seen by therapy. Patient resting in bed and reports being up earlier today. He reports getting winded and currently feeling too tired to engage in therapy at this time. Offered patient opportunity to complete bed level ADLs, patient stated \"I can do that by myself\", and further declined. Will attempt to follow up as available/appropriate.     Thank you,  Sima Mathis, OTR/L        
Occupational Therapy  Chart reviewed and discussed with RN who recommend to hold at this time due to decreased oxygen sats and new Pneumothorax. Patient also pending results of chest x-ray. Will continue to follow.   Mar Bee OTR/L    
Occupational Therapy Note  11/21/2024    OT eval order received and acknowledged and attempted at 0910 (just returned to room eating breakfast), 0946 (with RT receiving breathing treatment) and 1012 (with echo). Will follow up at a later time as able.     Thank you,  Katalina Meyer OTR/L    
Occupational Therapy Note  11/22/2024    OT treatment attempted at 1330 however per nursing pt with increased SOB today with SPO2 desaturating with minimal activity and urinal use in bed thus requesting to defer therapy today. Will follow up as appropriate.    Thank you,  Katalina Meyer OTR/L    
[facility]ARY [facility]     Name: Davis Dixon MRN: [id number]   : 1945 Hospital: [facility]   Date: 2024        Impression Plan   Acute on chronic hypoxic respiratory failure  Pleural effusion: concerning for malignancy. May be CHF related since pt has new leg edema  COPD  Wt loss  Leg edema  Hx of two primary lung cancers               Wean O2 to keep sats above 90%; currently on 8L (on 2L at baseline)  Fu pleural fluid labs/cytology- still pending  CTA chest with contrast ordered to look for any tumor involvement or LAD  Continue abx for now.   Pain management   OOB into chair   Updated pt and daughter at bedside       Radiology  (personally reviewed) CT chest reviewed: Right sided moderate pleural effusion. Extensive emphysema.        Subjective     Cc: shortness of breath    78 yo with PMHx of chronic respiratory failure (2 liters), COPD, Lung cancer in  and in one in . Both of them resected. Pt states he has been more short of breath for the last 2 weeks. Denies fevers/chills. Coughing up white sputum. Smoked until 1 month ago. Weight loss has been a problem for the last 10 years. He has had leg swelling for the last month. CT chest with right sided somewhat loculated pleural effusion. WBC wnl on admission. proBNP elevated. Currently on 7 liters.    ECHO read - EF wnl, RV mildly dilated     s/p right thoracentesis with 1400ml hazy ike fluid removed     CXR: Diminished small right pleural effusion. No visualized pneumothorax. Improved right basilar atelectasis/airspace disease.    Interval history  Afebrile  1090 cc out since chest tube placed. 240 cc in the last 24 hrs. Good tidaling.   Pt has some chest tube pain anteriorly when he takes a deep breath    CXR  reviewed: minimal pleural effusion on right. Chest tube in place.       Past Medical History:   Diagnosis Date    AAA (abdominal aortic aneurysm) (HCC)     stenting 2019 by Dr. Collazo    
[facility]     Name: Davis Dixon MRN: [id number]   : 1945 Hospital: [facility]   Date: 2024        Impression Plan   Acute on chronic hypoxic respiratory failure  Pleural effusion: concerning for malignancy. May be CHF related since pt has new leg edema  COPD  Wt loss  Leg edema  Hx of two primary lung cancers               Wean O2 to keep sats above 90%; currently on 7L (on 2L at baseline)  Fu pleural fluid labs/cytology- still pending  Fu repeat CXR tomorrow AM. If pleural effusion drains well, will repeat CT chest to take a better look at lung parenchyma on the right.   ECHO read - EF wnl, RV mildly dilated  Continue abx for now.   Pain management   OOB into chair        Radiology  (personally reviewed) CT chest reviewed: Right sided moderate pleural effusion. Extensive emphysema.        Subjective     Cc: shortness of breath    78 yo with PMHx of chronic respiratory failure (2 liters), COPD, Lung cancer in 1980s and in one in . Both of them resected. Pt states he has been more short of breath for the last 2 weeks. Denies fevers/chills. Coughing up white sputum. Smoked until 1 month ago. Weight loss has been a problem for the last 10 years. He has had leg swelling for the last month. CT chest with right sided somewhat loculated pleural effusion. WBC wnl on admission. proBNP elevated. Currently on 7 liters.    Interval history  Afebrile  850 cc out since chest tube placed. Good tidaling.   Pt has some chest tube anteriorly     s/p right thoracentesis with 1400ml hazy ike fluid removed     CXR: Diminished small right pleural effusion. No visualized pneumothorax. Improved right basilar atelectasis/airspace disease.      Past Medical History:   Diagnosis Date    AAA (abdominal aortic aneurysm) (HCC)     stenting 2019 by Dr. Collazo    COPD (chronic obstructive pulmonary disease) (HCC)     History of lung surgery     Lung mass     TIA (transient 
PUL[facility]ARY ASSOC[facility]ATES [facility]     Name: Davis Dixon MRN: [id number]   : 1945 Hospital: [facility]   Date: 2024        Impression Plan   Acute on chronic hypoxic respiratory failure  Pleural effusion: pleural fluid cytology positive for adenocarcinoma  COPD  PTX  Small PE and DVT  Wt loss  Leg edema  Hx of two primary lung cancers               Likely aspirating off and on causing fluctuations in sats. CXR stable.  Wean O2 to keep sats above 90%; currently on 4L and baseline is 2L  Completed 7 day course of Zosyn   Prednisone taper  Eliquis  Scheduled Pulmicort/Brovana, Xopenex and Atrovent nebs  Oncology evaluated  Pain management   OOB into chair   PT/OT  Palliative following. Pt DNR        Radiology  (personally reviewed) CT chest reviewed: Right sided moderate pleural effusion. Extensive emphysema.        Subjective     Cc: shortness of breath    80 yo with PMHx of chronic respiratory failure (2 liters), COPD, Lung cancer in 1980s and in one in . Both of them resected. Pt states he has been more short of breath for the last 2 weeks. Denies fevers/chills. Coughing up white sputum. Smoked until 1 month ago. Weight loss has been a problem for the last 10 years. He has had leg swelling for the last month. CT chest with right sided somewhat loculated pleural effusion. WBC wnl on admission. proBNP elevated. Currently on 7 liters.    Interval history  Afebrile  BP stable  Sats 100% on 4L  2500ml UOP  Pleural fluid AFB smear neg  Pleural fluid cx no growth x 4 days  Pleural fluid fungal and AFB cx in process  Pleural fluid cytology positive for adenocarcinoma   s/p right thoracentesis with 1400ml hazy ike fluid removed   s/p pigtail chest tube placement; pulled on  ECHO read - EF wnl, RV mildly dilated     CXR: Diminished small right pleural effusion. No visualized pneumothorax. Improved right basilar atelectasis/airspace disease.    CTA Chest : 
[facility]     Name: Davis Dixon MRN: [id number]   : 1945 Hospital: [facility]   Date: 2024        Impression Plan   Acute on chronic hypoxic respiratory failure  Pleural effusion: concerning for malignancy. May be CHF related since pt has new leg edema  COPD  Wt loss  Leg edema  Hx of two primary lung cancers               Wean O2 to keep sats above 90%; weaned to 6L  Fu pleural fluid labs/cytology- still pending, pathology waiting on stains  Clamp chest tube and repeat CXR in 2 hours  Continue abx for now.   Continue IV steroids; wean to 20mg q12h  Heparin gtt  Scheduled Pulmicort/Brovana, Xopenex and Atrovent nebs  Pain management   Speech eval  OOB into chair   Updated pt and family at bedside       Radiology  (personally reviewed) CT chest reviewed: Right sided moderate pleural effusion. Extensive emphysema.        Subjective     Cc: shortness of breath    80 yo with PMHx of chronic respiratory failure (2 liters), COPD, Lung cancer in  and in one in . Both of them resected. Pt states he has been more short of breath for the last 2 weeks. Denies fevers/chills. Coughing up white sputum. Smoked until 1 month ago. Weight loss has been a problem for the last 10 years. He has had leg swelling for the last month. CT chest with right sided somewhat loculated pleural effusion. WBC wnl on admission. proBNP elevated. Currently on 7 liters.    ECHO read - EF wnl, RV mildly dilated     s/p right thoracentesis with 1400ml hazy ike fluid removed     CXR: Diminished small right pleural effusion. No visualized pneumothorax. Improved right basilar atelectasis/airspace disease.    Interval history  Afebrile  BP stable  Sats 93% on 7L NC  130ccs from chest tube documented in last 24 hours  Pleural fluid culture negative  Phos 2.2  CTA Chest : left lower lobe PE. small bilateral pleural effusions, right has improved compared to the prior exam, left 
[facility]     Name: Davis Dixon MRN: [id number]   : 1945 Hospital: [facility]   Date: 2024        Impression Plan   Acute on chronic hypoxic respiratory failure  Pleural effusion: concerning for malignancy. May be CHF related since pt has new leg edema  COPD  Wt loss  Leg edema  Hx of two primary lung cancers               Wean O2 to keep sats above 90%; currently on 7L (on 2L at baseline)  Fu pleural fluid labs/cytology- still pending  CXR today; pneumothorax may reflect trapped lung on imaging  Continue abx for now.   Continue IV steroids  Hold on AC until vascular surgery evaluates  Scheduled Pulmicort/Brovana, Xopenex and Atrovent nebs  Pain management   OOB into chair   Updated pt and family at bedside       Radiology  (personally reviewed) CT chest reviewed: Right sided moderate pleural effusion. Extensive emphysema.        Subjective     Cc: shortness of breath    80 yo with PMHx of chronic respiratory failure (2 liters), COPD, Lung cancer in 1980s and in one in . Both of them resected. Pt states he has been more short of breath for the last 2 weeks. Denies fevers/chills. Coughing up white sputum. Smoked until 1 month ago. Weight loss has been a problem for the last 10 years. He has had leg swelling for the last month. CT chest with right sided somewhat loculated pleural effusion. WBC wnl on admission. proBNP elevated. Currently on 7 liters.    ECHO read - EF wnl, RV mildly dilated     s/p right thoracentesis with 1400ml hazy ike fluid removed     CXR: Diminished small right pleural effusion. No visualized pneumothorax. Improved right basilar atelectasis/airspace disease.    Interval history  Afebrile  BP stable  Sats 100% on 7L NC  170ccs from chest tube documented in last 24 hours  Pleural fluid culture negative  CTA Chest : left lower lobe PE. small bilateral pleural effusions, right has improved compared to the prior exam, left 
PUL[facility]ARY ASSOC[facility]     Name: Davis Dixon MRN: [id number]   : 1945 Hospital: [facility]   Date: 2024        Impression Plan   Acute on chronic hypoxic respiratory failure  Pleural effusion: concerning for malignancy. May be CHF related since pt has new leg edema  COPD  Wt loss  Leg edema  Hx of two primary lung cancers               Likely aspirating off and on causing fluctuations in sats. Small PTX may be playing a part. CXR this morning pending  Wean O2 to keep sats above 90%; Can hopefully transition to midflow today  Continue zosyn for 7 days  Wean prednisone tomorrow  Heparin gtt  Scheduled Pulmicort/Brovana, Xopenex and Atrovent nebs  Pain management   OOB into chair   Updated family at bedside  Palliative following. Pt DNR       Radiology  (personally reviewed) CT chest reviewed: Right sided moderate pleural effusion. Extensive emphysema.        Subjective     Cc: shortness of breath    80 yo with PMHx of chronic respiratory failure (2 liters), COPD, Lung cancer in  and in one in . Both of them resected. Pt states he has been more short of breath for the last 2 weeks. Denies fevers/chills. Coughing up white sputum. Smoked until 1 month ago. Weight loss has been a problem for the last 10 years. He has had leg swelling for the last month. CT chest with right sided somewhat loculated pleural effusion. WBC wnl on admission. proBNP elevated. Currently on 7 liters.    ECHO read - EF wnl, RV mildly dilated     s/p right thoracentesis with 1400ml hazy ike fluid removed     CXR: Diminished small right pleural effusion. No visualized pneumothorax. Improved right basilar atelectasis/airspace disease.    CTA Chest : left lower lobe PE. small bilateral pleural effusions, right has improved compared to the prior exam, left new. Right sided chest tube in position with modreate right pneumothorax    CXR : bilateral interstitial opacities unchanged, 
PUL[facility]ARY ASSOC[facility]ATES [facility]     Name: Davis Dixon MRN: [id number]   : 1945 Hospital: [facility]   Date: 2024        Impression Plan   Acute on chronic hypoxic respiratory failure  Pleural effusion: concerning for malignancy. May be CHF related since pt has new leg edema  COPD  Wt loss  Leg edema  Hx of two primary lung cancers               Wean O2 to keep sats above 90%; currently on 7L (on 2L at baseline)  Fu pleural fluid labs/cytology  Fu ECHO  Continue abx for now. Will see what imaging looks like post thoracentesis.  Fu post tap CXR.         Radiology  (personally reviewed) CT chest reviewed: Right sided moderate pleural effusion. Extensive emphysema.        Subjective     Cc: shortness of breath    80 yo with PMHx of chronic respiratory failure (2 liters), COPD, Lung cancer in  and in one in . Both of them resected. Pt states he has been more short of breath for the last 2 weeks. Denies fevers/chills. Coughing up white sputum. Smoked until 1 month ago. Weight loss has been a problem for the last 10 years. He has had leg swelling for the last month. CT chest with right sided somewhat loculated pleural effusion. WBC wnl on admission. proBNP elevated. Currently on 7 liters.    Interval history  Afebrile  BP stable  HR 100s  Sats 93% on 7L   s/p right thoracentesis with 1400ml hazy ike fluid removed      Review of Systems: Reports some chest discomfort during and after procedure - improving somewhat.  States he's unsure if his SOB is improved.  Denies fever or chills.  Continues to cough.    Pertinent items are noted in HPI.    Past Medical History:   Diagnosis Date    AAA (abdominal aortic aneurysm) (HCC)     stenting 2019 by Dr. Collazo    COPD (chronic obstructive pulmonary disease) (HCC)     History of lung surgery     Lung mass     TIA (transient ischemic attack)       No past surgical history on file.   Prior to Admission medications    Medication Sig 
PUL[facility]ARY ASSOC[facility]     Name: Davis Dixon MRN: [id number]   : 1945 Hospital: [facility]   Date: 2024        Impression Plan   Acute on chronic hypoxic respiratory failure  Pleural effusion: concerning for malignancy. May be CHF related since pt has new leg edema  COPD  Wt loss  Leg edema  Hx of two primary lung cancers               Wean O2 to keep sats above 90%; currently on 8L (on 2L at baseline)  Fu pleural fluid labs/cytology  Fu repeat CXR  Fu ECHO read - pending  Continue abx for now.        Radiology  (personally reviewed) CT chest reviewed: Right sided moderate pleural effusion. Extensive emphysema.        Subjective     Cc: shortness of breath    80 yo with PMHx of chronic respiratory failure (2 liters), COPD, Lung cancer in  and in one in . Both of them resected. Pt states he has been more short of breath for the last 2 weeks. Denies fevers/chills. Coughing up white sputum. Smoked until 1 month ago. Weight loss has been a problem for the last 10 years. He has had leg swelling for the last month. CT chest with right sided somewhat loculated pleural effusion. WBC wnl on admission. proBNP elevated. Currently on 7 liters.    Interval history  Afebrile  HR 110s-120s  BP soft/stable  Sats 88-89% on 7L during my exam; I increased to 8L  500ml UOP documented yesterday  proBNP 4523 - decreased from 7914  WBC 12.5 - increased  Hgb 11.9 - stable   s/p right thoracentesis with 1400ml hazy ike fluid removed     CXR: Diminished small right pleural effusion. No visualized pneumothorax. Improved right basilar atelectasis/airspace disease.    Review of Systems: Reports increased SOB/WOB.  Denies CP.  Reports left hip/side pain.  Denies fever or chills.  Denies cough.    Pertinent items are noted in HPI.    Past Medical History:   Diagnosis Date    AAA (abdominal aortic aneurysm) (HCC)     stenting 2019 by Dr. Collazo    COPD (chronic obstructive pulmonary 
PULMONARY ASSOCIATES [facility]     Name: Davis Dixon MRN: [id number]   : 1945 Hospital: [facility]   Date: 2024        Impression Plan   Acute on chronic hypoxic respiratory failure  Pleural effusion: concerning for malignancy. May be CHF related since pt has new leg edema  COPD  Wt loss  Leg edema  Hx of two primary lung cancers               Wean O2 to keep sats above 90%; weaned to 6L  Fu pleural fluid labs/cytology.  Removed chest tube this AM.  Repeat CXR in 2 hours.  Continue abx for now.   Switch steroids to prednisone  Heparin gtt  Scheduled Pulmicort/Brovana, Xopenex and Atrovent nebs  Pain management   Declined speech eval, he is aware of his aspiration risk and   OOB into chair   Updated pt and family at bedside       Radiology  (personally reviewed) CT chest reviewed: Right sided moderate pleural effusion. Extensive emphysema.        Subjective     Cc: shortness of breath    78 yo with PMHx of chronic respiratory failure (2 liters), COPD, Lung cancer in  and in one in . Both of them resected. Pt states he has been more short of breath for the last 2 weeks. Denies fevers/chills. Coughing up white sputum. Smoked until 1 month ago. Weight loss has been a problem for the last 10 years. He has had leg swelling for the last month. CT chest with right sided somewhat loculated pleural effusion. WBC wnl on admission. proBNP elevated. Currently on 7 liters.    ECHO read - EF wnl, RV mildly dilated     s/p right thoracentesis with 1400ml hazy ike fluid removed     CXR: Diminished small right pleural effusion. No visualized pneumothorax. Improved right basilar atelectasis/airspace disease.    Interval history  Afebrile  BP stable  Sats 96% on 4L NC; required 7L overnight and with therapy yesterday  70ccs from chest tube documented in last 24 hours- chest tube currently clamped  Pleural fluid culture negative  Liver enzymes decreased  CTA Chest : left lower 
Palliative Medicine    Code Status: DNR    Advance Care Planning:    Primary Decision Maker: Cale Dixon - Child - [phone]    Pt has AMD on file dated 3/4/2024 which appoints son Jay Dixon as sole Medical POA.    Patient / Family Encounter Documentation    Participants (names): Pt, son Cale, Palliative Medicine (Dr. Calvert, [facility])    Narrative:  Pt was sitting up in chair with son present in room.  Pt is ; wife  in .  Pt had been living alone with his pets but recently asked dtr to stay with him.  Pt is noted to have five children in addition to grand and great-grandchildren.      Pt is currently on hi-flow O2 at 20 liters, was able to engage in conversation without apparent difficulty but reports feelings of SOB come and go.  Congestion was audible with coughing.  Pt complained of discomfort to drain site, has pain meds ordered but stated he has been using pain meds to help him sleep and to treat anxiety.  Dr. Calvert provided education re: pain vs anxiety meds and proper usage.    Psychosocial Issues Identified/ Resilience Factors: Pt reports significant anxiety, particularly at night, related to stage IV cancer diagnosis and related symptoms.  Pt shared that he now has family staying in the home with him not only to help care for the pets but to call for help in the event of a medical emergency.  Son shared that pt has strong support in place from family.  No spiritual concerns expressed; pt is noted to be Denominational.     Caregiver [place]: Pt was mostly independent prior to hospitalization.   Does the caregiver feel confident administering medication? No concerns expressed.  Does the caregiver need any help connecting with community resources? Not at this time  Does the caregiver feel confident assisting with activities of daily living? No concerns expressed.     Goals of Care / Plan: Pt is still processing the news of his cancer diagnosis, has not yet made any decisions re: whether he 
Palliative Medicine  Patient Name: Davis Dixon  YOB: 1945  MRN: [id number]  Age: 79 y.o.  Gender: male    Date of Initial Consult: 11/25/2024  Date of Service:11/25/2024  Time: 11:10 AM  Provider: IJEOMA Rdoriguez NP    Admit Date: 11/20/2024  Referring Provider: Dr. Schaffer      Reasons for Consultation:  Goals of Care    HISTORY OF PRESENT ILLNESS (HPI):   Davis Dixon is a 79 y.o. male with a past medical history of COPD on 2 L NC continuously, AAA s/p 2019, TIA, history of 2 primary lung cancers s/p lung resection both times ( 1st [id number] and 2nd in 2000s), +long term tobacco use up until 10/2024,  who presented from home with CC of worsening chronic SOB x 1 week, with intermittent coughing up gray-colored mucus and also reports weight loss 11/20/2024 with a diagnosis of acute on chronic hypoxic respiratory failure, right pleural effusion 2/2 CHF versus malignancy.     ER workup:  On exam patient O2 sat 68% on 2 L O2, O2 sats improved on NRB 5 L.  Abnormal labs-BNP> 7000.    CXR-right pleural effusion with associated consolidation, most likely atelectasis.  CT chest shows moderate right pleural effusion with collapse of lung base, severe emphysema.  Recommended follow-up to exclude underlying mass lesion.  Borderline enlarged precarinal node.    Course of hospitalization:  - 11/21: on 7 L nasal cannula. CXR with diminished small right pleural effusion, slightly improved right basilar atelectasis/airspace disease.    - 11/22: s/p right sided thoracentesis removing 1400 mL hazy ike fluid, chest tube placed..    - 11/23: US duplex right LE-+ acute DVT in right soleal vein.  Acute DVT right peroneal vein.      - 11/24: CTA chest-1) + LLL PE.  2) Moderate right pleural effusion with chest tube in position. 3) Improved right pleural effusion.  4) Small left pleural effusion is new.  5) 4.2 cm infrarenal AAA with endograft in position and possible endoleak    - 11/25: remains on 7 L 
Patient refusing CHG bath. RN provided education on importance of CHG to decrease risk of infection. Patient complaining of SOB while using urinal in bed stating \"maybe in a little while.\"  
Physical Therapy    Chart reviewed up to date in prep for PT treatment. Spoke with RN who notes pt unable to tolerate activity due to O2 desat to 60s with minimal movement in bed. Will defer PT session at this time and con't to follow.    Letty Duran, PT, MPT  
Physical Therapy  Attempted to see patient, patient with other providers throughout the AM and having increased pain after thoracentesis.  We will re-attempt later as able.  Shamika Chairez PT,DPT,NCS,CLT    
Physical and occupational therapy note    Patient declines PT/OT at this time. He reports he's had conversations with hospice and has a lot on his mind. He politely declines at this time.   
Pt confident in decision to go home with Hospice. Family member with connections to [facility] and has initiated. Will have our CM confirm and make sure there is nothing else we can do to help facilitate.   
SLP spoke with Mr. Dixon. He declined any additional swallowing evaluation at this time.    
below)    ______________________________________________________________________    Medications:     Current Facility-Administered Medications   Medication Dose Route Frequency    levalbuterol (XOPENEX) nebulizer solution 1.25 mg  1.25 mg Nebulization 4x Daily RT    ipratropium (ATROVENT) 0.02 % nebulizer solution 0.5 mg  0.5 mg Nebulization 4x Daily RT    doxycycline (VIBRAMYCIN) 100 mg in sodium chloride 0.9 % 100 mL IVPB (mini-bag)  100 mg IntraVENous Q12H    methylPREDNISolone sodium succ (SOLU-MEDROL) 40 mg in sterile water 1 mL injection  40 mg IntraVENous Q12H    piperacillin-tazobactam (ZOSYN) 3,375 mg in sodium chloride 0.9 % 50 mL IVPB (mini-bag)  3,375 mg IntraVENous Q8H    midodrine (PROAMATINE) tablet 5 mg  5 mg Oral TID WC    furosemide (LASIX) injection 20 mg  20 mg IntraVENous BID    morphine sulfate (PF) injection 2 mg  2 mg IntraVENous Q4H PRN    lidocaine 4 % external patch 1 patch  1 patch TransDERmal Daily    LORazepam (ATIVAN) tablet 0.5 mg  0.5 mg Oral TID PRN    budesonide (PULMICORT) nebulizer suspension 500 mcg  0.5 mg Nebulization BID RT    arformoterol tartrate (BROVANA) nebulizer solution 15 mcg  15 mcg Nebulization BID RT    enoxaparin (LOVENOX) injection 40 mg  40 mg SubCUTAneous Daily    aspirin chewable tablet 81 mg  81 mg Oral Daily    melatonin tablet 9 mg  9 mg Oral Nightly    pantoprazole (PROTONIX) tablet 40 mg  40 mg Oral QAM AC    oxyCODONE (ROXICODONE) immediate release tablet 5 mg  5 mg Oral Q4H PRN    pravastatin (PRAVACHOL) tablet 40 mg  40 mg Oral Nightly    sodium chloride flush 0.9 % injection 5-40 mL  5-40 mL IntraVENous 2 times per day    sodium chloride flush 0.9 % injection 5-40 mL  5-40 mL IntraVENous PRN    0.9 % sodium chloride infusion   IntraVENous PRN    potassium chloride (KLOR-CON) extended release tablet 40 mEq  40 mEq Oral PRN    Or    potassium bicarb-citric acid (EFFER-K) effervescent tablet 40 mEq  40 mEq Oral PRN    Or    potassium chloride 10 mEq/100 
but this has been weaned over the past 24 hours.    He is accompanied by his son-in-law in the room.      Review of systems was obtained and pertinent findings reviewed above. Past medical history, social history, family history, medications, and allergies are located in the electronic medical record.    Physical Exam:   Visit Vitals  BP (!) 119/59   Pulse 85   Temp 98 °F (36.7 °C) (Oral)   Resp 20   Ht 1.702 m (5' 7\")   Wt 57.7 kg (127 lb 4.8 oz)   SpO2 99%   BMI 19.94 kg/m²     General: no distress  Respiratory: normal respiratory effort, on O2 by nc  CV: no peripheral edema  Skin: no rashes; no ecchymoses; no petechiae        Results:     Lab Results   Component Value Date    WBC 8.4 12/01/2024    HGB 10.5 (L) 12/01/2024    HCT 32.4 (L) 12/01/2024     12/01/2024    MCV 97.3 12/01/2024    NEUTROABS 7.4 11/28/2024     Lab Results   Component Value Date     12/02/2024    K 4.0 12/02/2024    CL 99 12/02/2024    CO2 38 (H) 12/02/2024    GLUCOSE 93 12/02/2024    BUN 12 12/02/2024    CREATININE 0.59 (L) 12/02/2024    LABGLOM >90 12/02/2024    CALCIUM 9.3 12/02/2024    MG 2.0 12/02/2024    PHOS 2.9 12/02/2024     Lab Results   Component Value Date    BILITOT 0.4 12/01/2024    ALT 39 12/01/2024    AST 21 12/01/2024    ALKPHOS 66 12/01/2024    PROTEIN 5.0 (L) 12/01/2024    ALBUMIN 2.2 (L) 12/02/2024    GLOB 2.8 12/01/2024     Lab Results   Component Value Date    IRON 97 11/26/2024    TIBC 263 11/26/2024    IRONPERSAT 37 11/26/2024    FERRITIN 514 (H) 11/26/2024    [id number] 833 11/26/2024    FOLATE 6.9 11/26/2024     11/22/2024     Lab Results   Component Value Date    INR 1.1 12/02/2024    PROTIME 11.5 (H) 12/02/2024    APTT 25.6 11/25/2024 11/22/24 Doppler ; rt    Acute deep vein thrombosis in the right soleal vein.    Acute deep vein thrombosis in the right peroneal vein    11/24/24 CTA CHEST  IMPRESSION:  Left lower lobe pulmonary embolism. Moderate right pleural effusion with chest tube in 
small bilateral pleural effusions, right has improved compared to the prior exam, left new. Right sided chest tube in position with modreate right pneumothorax    CXR 11/27: bilateral interstitial opacities unchanged, no pneumothorax    Pleural fluid cytology positive for adenocarcinoma    Interval history  Afebrile  BP stable  Sats 98% on 20 liters and 40%  CXR 11/28- stable small PTX    ROS: Denies SOB    Past Medical History:   Diagnosis Date    AAA (abdominal aortic aneurysm) (HCC)     stenting 2019 by Dr. Collazo    COPD (chronic obstructive pulmonary disease) (HCC)     History of lung surgery     Lung mass     TIA (transient ischemic attack)       Past Surgical History:   Procedure Laterality Date    US CHEST TUBE INSERTION  11/22/2024      Prior to Admission medications    Medication Sig Start Date End Date Taking? Authorizing Provider   Aspirin 81 MG CAPS Take 81 mg by mouth daily   Yes Andrew Corcoran MD   Multiple Vitamins-Minerals (MULTIVITAMIN ADULT EXTRA C PO) Take 1 tablet by mouth daily   Yes Andrew Corcoran MD   B Complex-Folic Acid (B COMPLEX-VITAMIN B12 PO) Take by mouth   Yes Andrew Corcoran MD   acetaminophen (TYLENOL) 325 MG tablet Take 1 tablet by mouth every 4 hours as needed   Yes Andrew Corcoran MD   pravastatin (PRAVACHOL) 40 MG tablet Take 1 tablet by mouth nightly   Yes Andrew Corcoran MD   QUEtiapine (SEROQUEL) 50 MG tablet  7/6/23  Yes Andrew Corcoran MD   oxyCODONE (ROXICODONE) 5 MG immediate release tablet Take 1 tablet by mouth every 4 hours as needed. 1/21/12  Yes Andrew Corcoran MD   omeprazole (PRILOSEC) 20 MG delayed release capsule Take 1 capsule by mouth every morning (before breakfast) 7/8/15  Yes Andrew Corcoran MD   Melatonin 10 MG TBDP Take by mouth   Yes Andrew Corcoran MD   Ginger, Zingiber officinalis, 250 MG CAPS Take by mouth   Yes Andrew Corcoran MD       Allergies   Allergen Reactions    Wool Alcohol 
the patient had two or more falls in the past year or any fall with injury in the past year?: No  Receives Help From:  (son checks in once a week, daughter staying with pt now)  ADL Assistance: Independent  Homemaking Assistance: Independent  Ambulation Assistance: Independent  Transfer Assistance: Independent    Cognitive and Communication Status:  Neurologic State: Alert  Orientation Level: Oriented x4  Cognition: Follows commands    Dysphagia:  Oral Assessment:     P.O. Trials:  PO Trials  Assessment Method(s): Observation;Palpation  Patient Position: upright in bed  Vocal Quality: No Impairment  Consistency Presented: Soft & Bite Sized;Pureed;Thin  How Presented: Self-fed/presented;Spoon;Straw  Bolus Acceptance: No impairment  Bolus Formation/Control: No impairment  Propulsion: No impairment  Oral Residue: None  Initiation of Swallow: No impairment  Laryngeal Elevation: Weak  Aspiration Signs/Symptoms:  (he had coughing and hawking with production on 75% of boluses at breakfast.  suspect pharyngeal weakness and aspiration in addition to his h/o esophageal dysphagia)            Motor Speech:     wnl    Language Comprehension and Expression:            wnl            Respiratory Status/Airway:  Room air                         After treatment:   Patient left in no apparent distress sitting up in chair and Patient left in no apparent distress in bed    COMMUNICATION/EDUCATION:   Patient was educated regarding dysphagia.  He demonstrated Good understanding as evidenced by Verbalizing understanding.     The patient's plan of care including recommendations, planned interventions, and recommended diet changes were discussed with: Registered nurse and pulm PA    Patient/family have participated as able in goal setting and plan of care and Patient/family agree to work toward stated goals and plan of care    Thank you,  Ophelia Burnham, SLP    SLP Individual Minutes  Time In: 0900  Time Out: 0920  Minutes: 20     
94%   07/17/23 96%          Intake/Output Summary (Last 24 hours) at 11/24/2024 1337  Last data filed at 11/24/2024 0730  Gross per 24 hour   Intake 240 ml   Output 915 ml   Net -675 ml          Exam:     Physical Exam:    Gen:  No acute distress.  Nontoxic-appearing.  HEENT:  NC/AT.   Resp:  Unlabored breathing.  Scattered expiratory wheezes.  Diminished basal breath sounds, right-sided pigtail in place.  Card:  Regular rate and rhythm. Normal S1 and S2.   Abd:  Soft, non-tender, non-distended, normoactive bowel sounds.  Ext: Bilateral lower extremity edema, right greater than left.  Neuro:  Moving all extremities with no gross focal deficits appreciated.  Psych:  Good insight, oriented to person, place and time, alert.    Medications Reviewed: (see below)    Lab Data Reviewed: (see below)  ______________________________________________________________________    Medications:     Current Facility-Administered Medications   Medication Dose Route Frequency    ipratropium (ATROVENT) 0.02 % nebulizer solution 0.5 mg  0.5 mg Nebulization Q6H WA RT    levalbuterol (XOPENEX) nebulizer solution 1.25 mg  1.25 mg Nebulization Q6H WA RT    guaiFENesin (MUCINEX) extended release tablet 600 mg  600 mg Oral BID    midodrine (PROAMATINE) tablet 10 mg  10 mg Oral TID WC    furosemide (LASIX) tablet 20 mg  20 mg Oral Daily    doxycycline (VIBRAMYCIN) 100 mg in sodium chloride 0.9 % 100 mL IVPB (mini-bag)  100 mg IntraVENous Q12H    methylPREDNISolone sodium succ (SOLU-MEDROL) 40 mg in sterile water 1 mL injection  40 mg IntraVENous Q12H    piperacillin-tazobactam (ZOSYN) 3,375 mg in sodium chloride 0.9 % 50 mL IVPB (mini-bag)  3,375 mg IntraVENous Q8H    morphine sulfate (PF) injection 2 mg  2 mg IntraVENous Q4H PRN    lidocaine 4 % external patch 1 patch  1 patch TransDERmal Daily    heparin (porcine) injection 4,600 Units  80 Units/kg IntraVENous PRN    heparin (porcine) injection 2,300 Units  40 Units/kg IntraVENous PRN    
Mother           Laboratory: I have personally reviewed the flowsheet and labs.     Recent Labs     11/22/24 1830 11/24/24  0318   WBC 15.9* 10.5   HGB 12.3 10.5*   HCT 37.6 32.1*    176     Recent Labs     11/22/24  1830 11/23/24  0035 11/24/24 0318 11/25/24  0210   NA  --  137 137 140   K  --  4.2 3.8 3.8   CL  --  101 102 105   CO2  --  31 31 31   BUN  --  21* 19 21*   MG  --   --  2.1  --    INR 1.3*  --   --   --        Objective:     Vitals:    11/25/24 0815   BP: (!) 140/69   Pulse: 71   Resp: 17   Temp: 97.8 °F (36.6 °C)   SpO2: 100%       Intake/Output Summary (Last 24 hours) at 11/25/2024 0838  Last data filed at 11/25/2024 0700  Gross per 24 hour   Intake --   Output 670 ml   Net -670 ml     EXAM:   GENERAL: thin/frail, chronically ill appearing, awake and alert, pigtail in place, no airleak HEENT:  anicteric, EOMI, no alar flaring or epistaxis, oral mucosa moist without cyanosis, NECK:  no jugular vein distention, no retractions, no thyromegaly or masses, LUNGS: decreased breath sounds over left base, increased WOB HEART:  Regular rate and rhythm with no MGR; no edema is present in LE, ABDOMEN:  soft with no tenderness,  EXTREMITIES:  warm with no cyanosis, SKIN:  no jaundice or ecchymosis, and NEUROLOGIC:  alert and oriented, grossly non-focal    IJEOMA Briones - NP  [facility]        
RT    guaiFENesin  600 mg Oral BID    midodrine  10 mg Oral TID WC    furosemide  20 mg Oral Daily    methylPREDNISolone  40 mg IntraVENous Q12H    piperacillin-tazobactam  3,375 mg IntraVENous Q8H    lidocaine  1 patch TransDERmal Daily    budesonide  0.5 mg Nebulization BID RT    arformoterol tartrate  15 mcg Nebulization BID RT    [Held by provider] aspirin  81 mg Oral Daily    melatonin  9 mg Oral Nightly    pantoprazole  40 mg Oral QAM AC    pravastatin  40 mg Oral Nightly    sodium chloride flush  5-40 mL IntraVENous 2 times per day    QUEtiapine  50 mg Oral Nightly     Continuous Infusions:   sodium chloride Stopped (11/23/24 0158)     PRN Meds: morphine, LORazepam, oxyCODONE, sodium chloride flush, sodium chloride, potassium chloride **OR** potassium alternative oral replacement **OR** potassium chloride, magnesium sulfate, ondansetron **OR** ondansetron, polyethylene glycol, acetaminophen **OR** acetaminophen, albuterol    Current Nutrition Therapies:  Diet: ADULT DIET; Regular; Double Protein Portions  ADULT ORAL NUTRITION SUPPLEMENT; Lunch, Dinner; Standard 4 oz Oral Supplement      Meal Intake:   Patient Vitals for the past 168 hrs:   PO Meals Eaten (%)   11/23/24 1528 26 - 50%   11/21/24 1910 0%   11/20/24 2025 0%     Supplement Intake:  No data found.    Anthropometric Measures:  Height: 170.2 cm (5' 7\")  Ideal Body Weight (IBW): 148 lbs (67 kg)    Admission Body Weight: 57.6 kg (127 lb) (stated)  Current Body Weight: 53.3 kg (117 lb 9.6 oz), 79.5 % IBW. Weight Source: Bed scale  Current BMI (kg/m2): 18.4  Usual Body Weight: 54.4 kg (120 lb)  % Weight Change (Calculated): -2  Weight Adjustment For: No Adjustment                 BMI Categories: Underweight (BMI less than 22) age over 65    Wt Readings from Last 10 Encounters:   11/22/24 57 kg (125 lb 11.2 oz)   07/17/23 55.6 kg (122 lb 9.6 oz)   07/11/23 57.6 kg (127 lb)   04/20/23 57.7 kg (127 lb 3.2 oz)     Last Weight Metrics:      11/22/2024     
Intake:  No data found.    Anthropometric Measures:  Height: 170.2 cm (5' 7\")  Ideal Body Weight (IBW): 148 lbs (67 kg)    Admission Body Weight: 57.6 kg (127 lb) (stated)  Current Body Weight: 53.3 kg (117 lb 9.6 oz), 79.5 % IBW. Weight Source: Bed scale  Current BMI (kg/m2): 18.4  Usual Body Weight: 54.4 kg (120 lb)  % Weight Change (Calculated): -2  Weight Adjustment For: No Adjustment                 BMI Categories: Underweight (BMI less than 22) age over 65    Wt Readings from Last 10 Encounters:   11/27/24 57.7 kg (127 lb 4.8 oz)   07/17/23 55.6 kg (122 lb 9.6 oz)   07/11/23 57.6 kg (127 lb)   04/20/23 57.7 kg (127 lb 3.2 oz)     Last Weight Metrics:      11/27/2024     6:06 AM 11/22/2024     4:05 AM 11/21/2024     4:41 PM 11/21/2024    10:10 AM 11/21/2024     3:19 AM 11/20/2024     9:58 AM 7/17/2023    11:13 AM   Weight Loss Metrics   Height   5' 7\" 5' 7\"  5' 7\" 5' 7\"   Weight - Scale 127 lbs 5 oz 125 lbs 11 oz 117 lbs 10 oz 127 lbs 127 lbs 11 oz 120 lbs 122 lbs 10 oz   BMI (Calculated) 20 kg/m2 19.7 kg/m2 18.5 kg/m2 19.9 kg/m2 20 kg/m2 18.8 kg/m2 19.2 kg/m2       Nutrition Diagnosis:   Severe malnutrition, in context of chronic illness related to catabolic illness as evidenced by BMI, weight loss, criteria as identified in malnutrition assessment    Nutrition Interventions:   Food and/or Nutrient Delivery: Modify Current Diet, Start Oral Nutrition Supplement  Nutrition Education/Counseling: Education/Counseling completed  Coordination of Nutrition Care: Continue to monitor while inpatient  Plan of Care discussed with: patient and daughter    Goals:  Previous Goal Met: Goal(s) Achieved  Goals: PO intake 75% or greater, by next RD assessment       Nutrition Monitoring and Evaluation:   Behavioral-Environmental Outcomes: None Identified  Food/Nutrient Intake Outcomes: Supplement Intake, Food and Nutrient Intake  Physical Signs/Symptoms Outcomes: Biochemical Data, Weight, Meal Time Behavior    Discharge 
piperacillin-tazobactam (ZOSYN) 3,375 mg in sodium chloride 0.9 % 50 mL IVPB (mini-bag)  3,375 mg IntraVENous Q8H    morphine sulfate (PF) injection 2 mg  2 mg IntraVENous Q4H PRN    lidocaine 4 % external patch 1 patch  1 patch TransDERmal Daily    LORazepam (ATIVAN) tablet 0.5 mg  0.5 mg Oral TID PRN    budesonide (PULMICORT) nebulizer suspension 500 mcg  0.5 mg Nebulization BID RT    arformoterol tartrate (BROVANA) nebulizer solution 15 mcg  15 mcg Nebulization BID RT    [Held by provider] aspirin chewable tablet 81 mg  81 mg Oral Daily    melatonin tablet 9 mg  9 mg Oral Nightly    pantoprazole (PROTONIX) tablet 40 mg  40 mg Oral QAM AC    oxyCODONE (ROXICODONE) immediate release tablet 5 mg  5 mg Oral Q4H PRN    pravastatin (PRAVACHOL) tablet 40 mg  40 mg Oral Nightly    sodium chloride flush 0.9 % injection 5-40 mL  5-40 mL IntraVENous 2 times per day    sodium chloride flush 0.9 % injection 5-40 mL  5-40 mL IntraVENous PRN    0.9 % sodium chloride infusion   IntraVENous PRN    potassium chloride (KLOR-CON) extended release tablet 40 mEq  40 mEq Oral PRN    Or    potassium bicarb-citric acid (EFFER-K) effervescent tablet 40 mEq  40 mEq Oral PRN    Or    potassium chloride 10 mEq/100 mL IVPB (Peripheral Line)  10 mEq IntraVENous PRN    magnesium sulfate 2000 mg in 50 mL IVPB premix  2,000 mg IntraVENous PRN    ondansetron (ZOFRAN-ODT) disintegrating tablet 4 mg  4 mg Oral Q8H PRN    Or    ondansetron (ZOFRAN) injection 4 mg  4 mg IntraVENous Q6H PRN    polyethylene glycol (GLYCOLAX) packet 17 g  17 g Oral Daily PRN    acetaminophen (TYLENOL) tablet 650 mg  650 mg Oral Q6H PRN    Or    acetaminophen (TYLENOL) suppository 650 mg  650 mg Rectal Q6H PRN    albuterol (ACCUNEB) nebulizer solution 1.25 mg  1.25 mg Nebulization Q6H PRN    QUEtiapine (SEROQUEL) tablet 50 mg  50 mg Oral Nightly        Lab Review:     Recent Labs     11/24/24  0318 11/25/24  1755 11/25/24  2345   WBC 10.5 9.6 8.7   HGB 10.5* 11.3* 10.1* 
with no MGR; no edema is present in LE, ABDOMEN:  soft with no tenderness,  EXTREMITIES:  warm with no cyanosis, SKIN:  no jaundice or ecchymosis, and NEUROLOGIC:  alert and oriented, grossly non-focal    PHIL Cook  [facility]        
   209 242     Recent Labs     11/27/24  0357 11/27/24  0358 11/28/24  0217 11/28/24  1135     --  136  --    K 4.4  --  3.8  --      --  102  --    CO2 32  --  32  --    BUN 18  --  17  --    MG  --  2.1  --  1.9   PHOS 2.3*  --  2.4*  --    ALT  --  81*  --  61   INR 1.2*  --   --  1.2*     No components found for: \"GLPOC\"      
 11/28/24 0217 11/29/24  0203   WBC 9.1 9.9   HGB 10.6* 11.2*   HCT 32.7* 33.2*    242     Recent Labs     11/28/24 0217 11/28/24  1135 11/30/24 0523 11/30/24 0524     --  135*  --    K 3.8  --  3.9  --      --  97  --    CO2 32  --  38*  --    BUN 17  --  14  --    MG  --  1.9 2.0  --    PHOS 2.4*  --  2.4*  --    ALT  --  61  --  47   INR  --  1.2* 1.1  --      No components found for: \"GLPOC\"      
Q6H PRN    albuterol (ACCUNEB) nebulizer solution 1.25 mg  1.25 mg Nebulization Q6H PRN    QUEtiapine (SEROQUEL) tablet 50 mg  50 mg Oral Nightly        Lab Review:     Recent Labs     11/25/24 1755 11/25/24 2345 11/27/24 0357   WBC 9.6 8.7 8.0   HGB 11.3* 10.1* 10.6*   HCT 35.1* 31.3* 32.6*    204 214     Recent Labs     11/25/24  0210 11/25/24 1755 11/25/24 2345 11/26/24  1215 11/26/24 1216 11/27/24 0357 11/27/24  0358     --  140  --   --  138  --    K 3.8  --  3.9  --   --  4.4  --      --  103  --   --  103  --    CO2 31  --  30  --   --  32  --    BUN 21*  --  21*  --   --  18  --    MG  --   --   --   --  2.1  --  2.1   PHOS  --   --  2.2*  --   --  2.3*  --    ALT  --   --   --   --  105*  --  81*   INR  --  1.1  --  1.2*  --  1.2*  --      No components found for: \"GLPOC\"

## 2024-12-03 NOTE — DISCHARGE INSTRUCTIONS
HOSPITALIST DISCHARGE INSTRUCTIONS  NAME:  Davis Dixon   :  1945   MRN:  [id number]     Date/Time:  12/3/2024 9:28 AM    ADMIT DATE: 2024     DISCHARGE DATE: 12/3/2024     DISCHARGE DIAGNOSIS:  Malignant pleural effusion  Pulmonary Embolism (Blood clot)    DISCHARGE INSTRUCTIONS:  Thank you for allowing us to participate in your care. Your discharging Hospitalist is Aidan Sanderson MD. You were admitted for evaluation and treatment of the above. You were found to have fluid around your lung that was caused by a new lung cancer. You were also found to have a blood clot. These caused your oxygen to be very low. This has improved. You will discharge home with Hospice services. I have written an order for you to continue the blood thinner to treat the blood clot. Whether you choose to take it is up to you and your hospice team.       MEDICATIONS:    It is important that you take the medication exactly as they are prescribed.   Keep your medication in the bottles provided by the pharmacist and keep a list of the medication names, dosages, and times to be taken in your wallet.   Do not take other medications without consulting your doctor.             If you experience any of the following symptoms then please call your primary care physician or return to the emergency room if you cannot get hold of your doctor:  Fever, chills, nausea, vomiting, diarrhea, change in mentation, falling, bleeding, shortness of breath    Follow Up:  Please call the below provider to arrange hospital follow up appointment      Braulio Bell MD  [address] [place] [address]  [phone]    Go on 2025  Follow up with Pulmonary set for  at 8:30 AM with Doctor Bell are their [facility] location in [place].      For questions regarding your Hospitalization or to contact the Hospital Medicine team, please call [phone].      Information obtained by :  I understand that if

## 2024-12-03 NOTE — PALLIATIVE CARE DISCHARGE
Goals of Care/Treatment Preferences    The Palliative Medicine team was consulted as part of your/your loved one's care in the hospital. Our team is a supportive service; we strive to relieve suffering and improve quality of life.    We reviewed advance care planning information, which includes the following:    Primary Decision Maker: Cale Dixon - [phone]  Patient's Healthcare Decision Maker is:: Named in Scanned ACP Document  Confirm Advance Directive: Yes, on file    Patient/Health Care Proxy Stated Goals: Return home with hospice support    We reviewed / discussed your code status as:   Code Status: DNR     “Full Code” means perform CPR in the event of cardiac arrest.      “DNR” means do NOT perform CPR in the event of cardiac arrest.      “Partial Code” means you have specific preferences, please discuss with your healthcare team.      “No Order” means this issue was not addressed / resolved during your stay    Medical Interventions: Limited additional interventions  Artificially Administered Nutrition: No feeding tube    Because of the importance of this information, we are providing you with a printed copy to share with other healthcare providers after this hospitalization is complete.

## 2024-12-09 LAB
BACTERIA SPEC CULT: NORMAL
SERVICE CMNT-IMP: NORMAL

## 2024-12-16 LAB
BACTERIA SPEC CULT: NORMAL
SERVICE CMNT-IMP: NORMAL

## 2024-12-23 LAB
BACTERIA SPEC CULT: NORMAL
SERVICE CMNT-IMP: NORMAL

## 2025-01-07 LAB
ACID FAST STN SPEC: NEGATIVE
MYCOBACTERIUM SPEC QL CULT: NEGATIVE
SPECIMEN PREPARATION: NORMAL
SPECIMEN SOURCE: NORMAL